# Patient Record
Sex: MALE | Race: WHITE | NOT HISPANIC OR LATINO | Employment: FULL TIME | URBAN - METROPOLITAN AREA
[De-identification: names, ages, dates, MRNs, and addresses within clinical notes are randomized per-mention and may not be internally consistent; named-entity substitution may affect disease eponyms.]

---

## 2017-01-04 DIAGNOSIS — M75.81 ROTATOR CUFF TENDINITIS, RIGHT: Primary | ICD-10-CM

## 2017-01-04 DIAGNOSIS — M75.51 BURSITIS OF RIGHT SHOULDER: ICD-10-CM

## 2017-03-20 ENCOUNTER — OFFICE VISIT (OUTPATIENT)
Dept: FAMILY MEDICINE CLINIC | Facility: CLINIC | Age: 52
End: 2017-03-20

## 2017-03-20 VITALS
RESPIRATION RATE: 16 BRPM | SYSTOLIC BLOOD PRESSURE: 170 MMHG | BODY MASS INDEX: 25.03 KG/M2 | WEIGHT: 169 LBS | TEMPERATURE: 97.4 F | HEART RATE: 84 BPM | HEIGHT: 69 IN | DIASTOLIC BLOOD PRESSURE: 110 MMHG | OXYGEN SATURATION: 96 %

## 2017-03-20 DIAGNOSIS — I10 ESSENTIAL HYPERTENSION: ICD-10-CM

## 2017-03-20 DIAGNOSIS — F41.1 GAD (GENERALIZED ANXIETY DISORDER): ICD-10-CM

## 2017-03-20 PROCEDURE — 99214 OFFICE O/P EST MOD 30 MIN: CPT | Performed by: FAMILY MEDICINE

## 2017-03-20 RX ORDER — IRBESARTAN 300 MG/1
300 TABLET ORAL DAILY
Qty: 30 TABLET | Refills: 5 | Status: SHIPPED | OUTPATIENT
Start: 2017-03-20 | End: 2017-09-20 | Stop reason: SDUPTHER

## 2017-03-20 RX ORDER — ALPRAZOLAM 1 MG/1
1 TABLET ORAL 2 TIMES DAILY PRN
Qty: 60 TABLET | Refills: 2 | Status: SHIPPED | OUTPATIENT
Start: 2017-03-20 | End: 2017-05-17 | Stop reason: SDUPTHER

## 2017-03-20 NOTE — PATIENT INSTRUCTIONS
Exercise 30 minutes most days of the week  Sleep 6-8 hours each night if possible  Low fat, low cholesterol diet   we discussed prescribed medications and how to take them   make sure you get results of any labs/studies ordered today  Low glycemic index diet      Patients must be seen every 3 months for their presription medication review and refill required by [place] law.  Patient has been advised on the potential for addiction  and dependence to their prescribed scheduled medication.  JADEN was obtained today and reviewed. This was scanned into the patient's chart.

## 2017-03-20 NOTE — PROGRESS NOTES
"Subjective   Raphael Garcia is a 51 y.o. male.     History of Present Illness   Chief Complaint:   Chief Complaint   Patient presents with   • Anxiety   • Hypertension       Raphael Garcia 51 y.o. male who presents today for Medical Management of the below listed issues and medication refills. His blood pressure was elevated in the office today: 178/118 and 107/110.  The patient has read and signed the [facility] Controlled Substance Contract. Out of bp meds  1 week  I will continue to see patient for regular follow up appointments.  They are well controlled on their medication.  JADEN has been reviewed by me and is updated every 3 months. The patient is aware of the potential for addiction and dependence.    he has a history of   Patient Active Problem List   Diagnosis   • Hypertension   • JAMEL (generalized anxiety disorder)   • Allergic rhinitis due to pollen   .  Since the last visit, he has overall felt well.  he has been compliant with   Current Outpatient Prescriptions:   •  ALPRAZolam (XANAX) 1 MG tablet, Take 1 tablet by mouth 4 (Four) Times a Day As Needed for anxiety., Disp: 120 tablet, Rfl: 2  •  cetirizine (ZyrTEC) 10 MG tablet, Take 1 tablet by mouth daily., Disp: 30 tablet, Rfl: 11  •  irbesartan (AVAPRO) 300 MG tablet, Take 1 tablet by mouth daily., Disp: 30 tablet, Rfl: 5  •  promethazine (PHENERGAN) 25 MG tablet, Take 1 tablet by mouth every 6 (six) hours as needed for nausea or vomiting., Disp: 16 tablet, Rfl: 0  •  sildenafil (VIAGRA) 100 MG tablet, Take 100 mg by mouth daily as needed for erectile dysfunction., Disp: , Rfl: .  he denies medication side effects.    All of the chronic condition(s) listed above are stable w/o issues.    Visit Vitals   • BP (!) 170/110   • Pulse 84   • Temp 97.4 °F (36.3 °C) (Oral)   • Resp 16   • Ht 69\" (175.3 cm)   • Wt 169 lb (76.7 kg)   • SpO2 96%   • BMI 24.96 kg/m2     The following portions of the patient's history were reviewed and updated " as appropriate: allergies, current medications, past family history, past medical history, past social history, past surgical history and problem list.    Review of Systems   Constitutional: Negative for activity change, appetite change and unexpected weight change.   Eyes: Negative for visual disturbance.   Respiratory: Negative for chest tightness and shortness of breath.    Cardiovascular: Negative for chest pain and palpitations.   Skin: Negative for color change.   Neurological: Negative for syncope and speech difficulty.   Psychiatric/Behavioral: Negative for behavioral problems and confusion.       Objective   Physical Exam   Constitutional: He is oriented to person, place, and time. He appears well-developed and well-nourished.   HENT:   Head: Normocephalic.   Cardiovascular: Normal rate and regular rhythm.    Pulmonary/Chest: Effort normal and breath sounds normal.   Musculoskeletal: Normal range of motion.   Neurological: He is alert and oriented to person, place, and time.   Psychiatric: He has a normal mood and affect. His behavior is normal.   Nursing note and vitals reviewed.      Assessment/Plan   Raphael was seen today for anxiety and hypertension.    Diagnoses and all orders for this visit:    Essential hypertension  -     irbesartan (AVAPRO) 300 MG tablet; Take 1 tablet by mouth Daily.  -     Comprehensive metabolic panel; Future  -     Lipid panel; Future  -     CBC and Differential; Future  -     TSH; Future  -     PSA; Future    JAMEL (generalized anxiety disorder)  -     ALPRAZolam (XANAX) 1 MG tablet; Take 1 tablet by mouth 2 (Two) Times a Day As Needed for Anxiety.  -     Comprehensive metabolic panel; Future  -     Lipid panel; Future  -     CBC and Differential; Future  -     TSH; Future  -     PSA; Future

## 2017-05-17 ENCOUNTER — OFFICE VISIT (OUTPATIENT)
Dept: FAMILY MEDICINE CLINIC | Facility: CLINIC | Age: 52
End: 2017-05-17

## 2017-05-17 VITALS
HEIGHT: 69 IN | BODY MASS INDEX: 24.73 KG/M2 | DIASTOLIC BLOOD PRESSURE: 100 MMHG | SYSTOLIC BLOOD PRESSURE: 170 MMHG | OXYGEN SATURATION: 96 % | TEMPERATURE: 97.5 F | WEIGHT: 167 LBS | HEART RATE: 83 BPM | RESPIRATION RATE: 16 BRPM

## 2017-05-17 DIAGNOSIS — J32.0 MAXILLARY SINUSITIS, UNSPECIFIED CHRONICITY: Primary | ICD-10-CM

## 2017-05-17 DIAGNOSIS — J06.9 ACUTE URI: ICD-10-CM

## 2017-05-17 DIAGNOSIS — R05.9 COUGH: ICD-10-CM

## 2017-05-17 DIAGNOSIS — J02.9 SORE THROAT: ICD-10-CM

## 2017-05-17 DIAGNOSIS — F41.1 GAD (GENERALIZED ANXIETY DISORDER): ICD-10-CM

## 2017-05-17 LAB
EXPIRATION DATE: NORMAL
INTERNAL CONTROL: NORMAL
Lab: NORMAL
S PYO AG THROAT QL: NEGATIVE

## 2017-05-17 PROCEDURE — 99214 OFFICE O/P EST MOD 30 MIN: CPT | Performed by: FAMILY MEDICINE

## 2017-05-17 PROCEDURE — 87880 STREP A ASSAY W/OPTIC: CPT | Performed by: FAMILY MEDICINE

## 2017-05-17 RX ORDER — AMOXICILLIN AND CLAVULANATE POTASSIUM 875; 125 MG/1; MG/1
1 TABLET, FILM COATED ORAL 2 TIMES DAILY
Qty: 20 TABLET | Refills: 0 | Status: SHIPPED | OUTPATIENT
Start: 2017-05-17 | End: 2017-06-20

## 2017-05-17 RX ORDER — ALPRAZOLAM 1 MG/1
1 TABLET ORAL 2 TIMES DAILY PRN
Qty: 5 TABLET | Refills: 0 | Status: SHIPPED | OUTPATIENT
Start: 2017-05-17 | End: 2017-06-20

## 2017-05-17 RX ORDER — ALPRAZOLAM 1 MG/1
1 TABLET ORAL 2 TIMES DAILY PRN
Qty: 5 TABLET | Refills: 0 | Status: SHIPPED | OUTPATIENT
Start: 2017-05-17 | End: 2017-05-17 | Stop reason: SDUPTHER

## 2017-06-19 DIAGNOSIS — F41.1 GAD (GENERALIZED ANXIETY DISORDER): ICD-10-CM

## 2017-06-19 RX ORDER — ALPRAZOLAM 1 MG/1
TABLET ORAL
Qty: 60 TABLET | Refills: 1 | OUTPATIENT
Start: 2017-06-19

## 2017-06-20 ENCOUNTER — OFFICE VISIT (OUTPATIENT)
Dept: FAMILY MEDICINE CLINIC | Facility: CLINIC | Age: 52
End: 2017-06-20

## 2017-06-20 ENCOUNTER — RESULTS ENCOUNTER (OUTPATIENT)
Dept: FAMILY MEDICINE CLINIC | Facility: CLINIC | Age: 52
End: 2017-06-20

## 2017-06-20 VITALS
TEMPERATURE: 97.7 F | HEART RATE: 89 BPM | SYSTOLIC BLOOD PRESSURE: 150 MMHG | RESPIRATION RATE: 16 BRPM | WEIGHT: 157 LBS | DIASTOLIC BLOOD PRESSURE: 78 MMHG | HEIGHT: 69 IN | BODY MASS INDEX: 23.25 KG/M2 | OXYGEN SATURATION: 94 %

## 2017-06-20 DIAGNOSIS — F41.1 GAD (GENERALIZED ANXIETY DISORDER): Primary | ICD-10-CM

## 2017-06-20 DIAGNOSIS — I10 ESSENTIAL HYPERTENSION: ICD-10-CM

## 2017-06-20 DIAGNOSIS — F41.1 GAD (GENERALIZED ANXIETY DISORDER): ICD-10-CM

## 2017-06-20 PROCEDURE — 99214 OFFICE O/P EST MOD 30 MIN: CPT | Performed by: FAMILY MEDICINE

## 2017-06-20 RX ORDER — ALPRAZOLAM 1 MG/1
1 TABLET ORAL 2 TIMES DAILY PRN
Qty: 60 TABLET | Refills: 0 | Status: CANCELLED | OUTPATIENT
Start: 2017-06-20

## 2017-06-20 RX ORDER — ALPRAZOLAM 1 MG/1
TABLET ORAL
Qty: 75 TABLET | Refills: 2 | Status: SHIPPED | OUTPATIENT
Start: 2017-06-20 | End: 2017-09-20 | Stop reason: SDUPTHER

## 2017-06-20 NOTE — PATIENT INSTRUCTIONS
Patients must be seen every 3 months for their presription medication review and refill required by [place] law.  Patient has been advised on the potential for addiction  and dependence to their prescribed scheduled medication.  JADEN was obtained today and reviewed. This was scanned into the patient's chart.  Try to wean down on xanax to total 2mg/day

## 2017-06-20 NOTE — PROGRESS NOTES
"Subjective   Raphael Garcia is a 52 y.o. male.     History of Present Illness   Chief Complaint:   Chief Complaint   Patient presents with   • Anxiety       Raphael Garcia 52 y.o. male who presents today for Medical Management of the below listed issues and medication refills. The patient has read and signed the [facility] Controlled Substance Contract.  I will continue to see patient for regular follow up appointments.  They are well controlled on their medication.  JADEN has been reviewed by me and is updated every 3 months. The patient is aware of the potential for addiction and dependence. We decreased his xanax to 1mg  Bid  Prn and he is having difficult time with decreasing xanax to that dose and would like to be able to take 1/2 of 1 mg  At bedtime prn. See script.    he has a history of   Patient Active Problem List   Diagnosis   • Hypertension   • JAMEL (generalized anxiety disorder)   • Allergic rhinitis due to pollen   .  Since the last visit, he has overall felt well.  he has been compliant with   Current Outpatient Prescriptions:   •  ALPRAZolam (XANAX) 1 MG tablet, Take 1 tablet by mouth 2 (Two) Times a Day As Needed for Anxiety for up to 5 doses., Disp: 5 tablet, Rfl: 0  •  cetirizine (ZyrTEC) 10 MG tablet, Take 1 tablet by mouth daily., Disp: 30 tablet, Rfl: 11  •  irbesartan (AVAPRO) 300 MG tablet, Take 1 tablet by mouth Daily., Disp: 30 tablet, Rfl: 5  •  promethazine (PHENERGAN) 25 MG tablet, Take 1 tablet by mouth every 6 (six) hours as needed for nausea or vomiting., Disp: 16 tablet, Rfl: 0  •  sildenafil (VIAGRA) 100 MG tablet, Take 100 mg by mouth daily as needed for erectile dysfunction., Disp: , Rfl: .  he denies medication side effects.    All of the chronic condition(s) listed above are stable w/o issues.    /92  Pulse 89  Temp 97.7 °F (36.5 °C) (Oral)   Resp 16  Ht 69\" (175.3 cm)  Wt 157 lb (71.2 kg)  SpO2 94%  BMI 23.18 kg/m2      The following portions of " the patient's history were reviewed and updated as appropriate: allergies, current medications, past family history, past medical history, past social history, past surgical history and problem list.    Review of Systems   Constitutional: Negative for activity change, appetite change and unexpected weight change.   Eyes: Negative for visual disturbance.   Respiratory: Negative for chest tightness and shortness of breath.    Cardiovascular: Negative for chest pain and palpitations.   Skin: Negative for color change.   Neurological: Negative for syncope and speech difficulty.   Psychiatric/Behavioral: Negative for behavioral problems and confusion.       Objective   Physical Exam   Constitutional: He is oriented to person, place, and time. He appears well-developed and well-nourished.   HENT:   Head: Normocephalic and atraumatic.   Eyes: EOM are normal. Pupils are equal, round, and reactive to light.   Neck: Normal range of motion. Neck supple.   Cardiovascular: Normal rate and regular rhythm.    Pulmonary/Chest: Effort normal and breath sounds normal.   Abdominal: Soft.   Musculoskeletal: Normal range of motion.   Lymphadenopathy:     He has no cervical adenopathy.   Neurological: He is alert and oriented to person, place, and time.   Psychiatric: He has a normal mood and affect. His behavior is normal.   Nursing note and vitals reviewed.      Assessment/Plan   Raphael was seen today for anxiety.    Diagnoses and all orders for this visit:    JAMEL (generalized anxiety disorder)    Essential hypertension    Other orders  -     Cancel: ALPRAZolam (XANAX) 1 MG tablet; Take 1 tablet by mouth 2 (Two) Times a Day As Needed for Anxiety for up to 5 doses.  -     ALPRAZolam (XANAX) 1 MG tablet; Take one tablet BID and may take an additional one half tablet at night prn

## 2017-09-20 ENCOUNTER — OFFICE VISIT (OUTPATIENT)
Dept: FAMILY MEDICINE CLINIC | Facility: CLINIC | Age: 52
End: 2017-09-20

## 2017-09-20 VITALS
OXYGEN SATURATION: 96 % | WEIGHT: 161 LBS | DIASTOLIC BLOOD PRESSURE: 82 MMHG | HEIGHT: 69 IN | BODY MASS INDEX: 23.85 KG/M2 | TEMPERATURE: 97.3 F | RESPIRATION RATE: 16 BRPM | HEART RATE: 82 BPM | SYSTOLIC BLOOD PRESSURE: 136 MMHG

## 2017-09-20 DIAGNOSIS — F41.1 GAD (GENERALIZED ANXIETY DISORDER): Primary | ICD-10-CM

## 2017-09-20 DIAGNOSIS — J30.1 ALLERGIC RHINITIS DUE TO POLLEN, UNSPECIFIED RHINITIS SEASONALITY: ICD-10-CM

## 2017-09-20 DIAGNOSIS — I10 ESSENTIAL HYPERTENSION: ICD-10-CM

## 2017-09-20 PROCEDURE — 99214 OFFICE O/P EST MOD 30 MIN: CPT | Performed by: FAMILY MEDICINE

## 2017-09-20 RX ORDER — IRBESARTAN 300 MG/1
300 TABLET ORAL DAILY
Qty: 30 TABLET | Refills: 5 | Status: SHIPPED | OUTPATIENT
Start: 2017-09-20 | End: 2018-03-19 | Stop reason: SDUPTHER

## 2017-09-20 RX ORDER — ALPRAZOLAM 1 MG/1
TABLET ORAL
Qty: 75 TABLET | Refills: 2 | Status: SHIPPED | OUTPATIENT
Start: 2017-09-20 | End: 2017-12-15 | Stop reason: SDUPTHER

## 2017-09-20 RX ORDER — CETIRIZINE HYDROCHLORIDE 10 MG/1
10 TABLET ORAL DAILY
Qty: 30 TABLET | Refills: 11 | Status: SHIPPED | OUTPATIENT
Start: 2017-09-20 | End: 2018-09-19 | Stop reason: SDUPTHER

## 2017-09-20 NOTE — PATIENT INSTRUCTIONS
Exercise 30 minutes most days of the week  Sleep 6-8 hours each night if possible  Low fat, low cholesterol diet   we discussed prescribed medications and how to take them   make sure you get results of any labs/studies ordered today  Low glycemic index diet      Patients must be seen every 3 months for their presription medication review and refill required by [place] law.  Patient has been advised on the potential for addiction  and dependence to their prescribed scheduled medication.  JADEN was obtained today and reviewed. This was scanned into the patient's chart.  Do labs already ordered

## 2017-09-20 NOTE — PROGRESS NOTES
"Subjective   Raphael Garcia is a 52 y.o. male.     History of Present Illness   Chief Complaint:   Chief Complaint   Patient presents with   • Anxiety   • Hypertension   • Allergic Rhinitis       Raphael Garcia 52 y.o. male who presents today for Medical Management of the below listed issues and medication refills. The patient has read and signed the [facility] Controlled Substance Contract.  I will continue to see patient for regular follow up appointments.  I discussed decreasing xanax 1mg to 1 bid but he said he does well with anxiety on this dose. He does have severe anxiety and was on it qid.  Refill other meds. They are well controlled on their medication.  JADEN has been reviewed by me and is updated every 3 months. The patient is aware of the potential for addiction and dependence.    he has a problem list of   Patient Active Problem List   Diagnosis   • Hypertension   • JAMEL (generalized anxiety disorder)   • Allergic rhinitis due to pollen   .  Since the last visit, he has overall felt well.  he has been compliant with   Current Outpatient Prescriptions:   •  ALPRAZolam (XANAX) 1 MG tablet, Take one tablet BID and may take an additional one half tablet at night prn, Disp: 75 tablet, Rfl: 2  •  cetirizine (ZyrTEC) 10 MG tablet, Take 1 tablet by mouth daily., Disp: 30 tablet, Rfl: 11  •  irbesartan (AVAPRO) 300 MG tablet, Take 1 tablet by mouth Daily., Disp: 30 tablet, Rfl: 5  •  promethazine (PHENERGAN) 25 MG tablet, Take 1 tablet by mouth every 6 (six) hours as needed for nausea or vomiting., Disp: 16 tablet, Rfl: 0  •  sildenafil (VIAGRA) 100 MG tablet, Take 100 mg by mouth daily as needed for erectile dysfunction., Disp: , Rfl: .  he denies medication side effects.    All of the chronic condition(s) listed above are stable w/o issues.    BP (!) 144/102  Pulse 82  Temp 97.3 °F (36.3 °C) (Oral)   Resp 16  Ht 69\" (175.3 cm)  Wt 161 lb (73 kg)  SpO2 96%  BMI 23.78 kg/m2    The " following portions of the patient's history were reviewed and updated as appropriate: allergies, current medications, past family history, past medical history, past social history, past surgical history and problem list.    Review of Systems   Constitutional: Negative for activity change, appetite change and unexpected weight change.   Eyes: Negative for visual disturbance.   Respiratory: Negative for chest tightness and shortness of breath.    Cardiovascular: Negative for chest pain and palpitations.   Skin: Negative for color change.   Neurological: Negative for syncope and speech difficulty.   Psychiatric/Behavioral: Negative for confusion and decreased concentration.       Objective   Physical Exam    Assessment/Plan   Raphael was seen today for anxiety, hypertension and allergic rhinitis.    Diagnoses and all orders for this visit:    JAMEL (generalized anxiety disorder)  -     ALPRAZolam (XANAX) 1 MG tablet; Take one tablet BID and may take an additional one half tablet at night prn    Essential hypertension  -     irbesartan (AVAPRO) 300 MG tablet; Take 1 tablet by mouth Daily.    Allergic rhinitis due to pollen, unspecified rhinitis seasonality  -     cetirizine (zyrTEC) 10 MG tablet; Take 1 tablet by mouth Daily.

## 2017-12-15 ENCOUNTER — OFFICE VISIT (OUTPATIENT)
Dept: FAMILY MEDICINE CLINIC | Facility: CLINIC | Age: 52
End: 2017-12-15

## 2017-12-15 VITALS
DIASTOLIC BLOOD PRESSURE: 100 MMHG | SYSTOLIC BLOOD PRESSURE: 169 MMHG | HEIGHT: 69 IN | OXYGEN SATURATION: 93 % | BODY MASS INDEX: 24.59 KG/M2 | WEIGHT: 166 LBS | HEART RATE: 94 BPM | RESPIRATION RATE: 16 BRPM | TEMPERATURE: 97.3 F

## 2017-12-15 DIAGNOSIS — F41.1 GAD (GENERALIZED ANXIETY DISORDER): ICD-10-CM

## 2017-12-15 PROCEDURE — 99213 OFFICE O/P EST LOW 20 MIN: CPT | Performed by: FAMILY MEDICINE

## 2017-12-15 RX ORDER — ALPRAZOLAM 1 MG/1
TABLET ORAL
Qty: 75 TABLET | Refills: 2 | Status: SHIPPED | OUTPATIENT
Start: 2017-12-15 | End: 2018-03-19 | Stop reason: SDUPTHER

## 2017-12-15 NOTE — PATIENT INSTRUCTIONS
Patients must be seen every 3 months for their presription medication review and refill required by [place] law.  Patient has been advised on the potential for addiction  and dependence to their prescribed scheduled medication.  JADEN was obtained today and reviewed. This was scanned into the patient's chart.

## 2017-12-15 NOTE — PROGRESS NOTES
"Subjective   Raphael Garcia is a 52 y.o. male.     History of Present Illness   Chief Complaint:   Chief Complaint   Patient presents with   • Anxiety   • Hypertension       Raphael Garcia 52 y.o. male who presents today for Medical Management of the below listed issues and medication refills. His blood pressure is elevated in the office today: 176/90 and 169/100. The patient has read and signed the [facility] Controlled Substance Contract.  I will continue to see patient for regular follow up appointments.  They are well controlled on their medication.  JADEN has been reviewed by me and is updated every 3 months. The patient is aware of the potential for addiction and dependence. This dose is working well.    he has a problem list of   Patient Active Problem List   Diagnosis   • Hypertension   • JAMEL (generalized anxiety disorder)   • Allergic rhinitis due to pollen   .  Since the last visit, he has overall felt well.  he has been compliant with   Current Outpatient Prescriptions:   •  ALPRAZolam (XANAX) 1 MG tablet, Take one tablet BID and may take an additional one half tablet at night prn, Disp: 75 tablet, Rfl: 2  •  cetirizine (zyrTEC) 10 MG tablet, Take 1 tablet by mouth Daily., Disp: 30 tablet, Rfl: 11  •  irbesartan (AVAPRO) 300 MG tablet, Take 1 tablet by mouth Daily., Disp: 30 tablet, Rfl: 5  •  sildenafil (VIAGRA) 100 MG tablet, Take 100 mg by mouth daily as needed for erectile dysfunction., Disp: , Rfl: .  he denies medication side effects.    All of the chronic condition(s) listed above are stable w/o issues.    /100  Pulse 94  Temp 97.3 °F (36.3 °C) (Oral)   Resp 16  Ht 175.3 cm (69\")  Wt 75.3 kg (166 lb)  SpO2 93%  BMI 24.51 kg/m2     The following portions of the patient's history were reviewed and updated as appropriate: allergies, current medications, past family history, past medical history, past social history, past surgical history and problem list.    Review of " Systems   Constitutional: Negative for activity change, appetite change and unexpected weight change.   Eyes: Negative for visual disturbance.   Respiratory: Negative for chest tightness and shortness of breath.    Cardiovascular: Negative for chest pain and palpitations.   Skin: Negative for color change.   Neurological: Negative for syncope and speech difficulty.   Psychiatric/Behavioral: Negative for confusion and decreased concentration.       Objective   Physical Exam   Constitutional: He is oriented to person, place, and time. He appears well-developed and well-nourished.   HENT:   Head: Atraumatic.   Mouth/Throat: Oropharynx is clear and moist.   Eyes: EOM are normal. Pupils are equal, round, and reactive to light.   Neck: Normal range of motion. Neck supple. No thyromegaly present.   Cardiovascular: Normal rate and regular rhythm.    Pulmonary/Chest: Effort normal and breath sounds normal.   Abdominal: Soft.   Musculoskeletal: Normal range of motion.   Neurological: He is alert and oriented to person, place, and time. He has normal reflexes. No cranial nerve deficit.   Skin: Skin is warm and dry.   Psychiatric: He has a normal mood and affect. His behavior is normal. Judgment and thought content normal.   Nursing note and vitals reviewed.      Assessment/Plan   Raphael was seen today for anxiety and hypertension.    Diagnoses and all orders for this visit:    JAMEL (generalized anxiety disorder)  -     ALPRAZolam (XANAX) 1 MG tablet; Take one tablet BID and may take an additional one half tablet at night prn

## 2018-03-19 ENCOUNTER — OFFICE VISIT (OUTPATIENT)
Dept: FAMILY MEDICINE CLINIC | Facility: CLINIC | Age: 53
End: 2018-03-19

## 2018-03-19 VITALS
WEIGHT: 163 LBS | HEIGHT: 69 IN | DIASTOLIC BLOOD PRESSURE: 94 MMHG | TEMPERATURE: 97.4 F | OXYGEN SATURATION: 93 % | RESPIRATION RATE: 16 BRPM | SYSTOLIC BLOOD PRESSURE: 150 MMHG | BODY MASS INDEX: 24.14 KG/M2 | HEART RATE: 89 BPM

## 2018-03-19 DIAGNOSIS — I10 ESSENTIAL HYPERTENSION: ICD-10-CM

## 2018-03-19 DIAGNOSIS — F41.1 GAD (GENERALIZED ANXIETY DISORDER): Primary | ICD-10-CM

## 2018-03-19 DIAGNOSIS — Z12.5 SCREENING FOR PROSTATE CANCER: ICD-10-CM

## 2018-03-19 PROCEDURE — 99214 OFFICE O/P EST MOD 30 MIN: CPT | Performed by: FAMILY MEDICINE

## 2018-03-19 RX ORDER — IRBESARTAN 300 MG/1
300 TABLET ORAL DAILY
Qty: 30 TABLET | Refills: 5 | Status: SHIPPED | OUTPATIENT
Start: 2018-03-19 | End: 2018-09-19 | Stop reason: SDUPTHER

## 2018-03-19 RX ORDER — ALPRAZOLAM 1 MG/1
TABLET ORAL
Qty: 75 TABLET | Refills: 2 | Status: SHIPPED | OUTPATIENT
Start: 2018-03-19 | End: 2018-06-18 | Stop reason: SDUPTHER

## 2018-03-19 NOTE — PROGRESS NOTES
"Subjective   Raphael Garcia is a 52 y.o. male.     History of Present Illness   Chief Complaint:   Chief Complaint   Patient presents with   • Anxiety   • Hypertension       Raphael Garcia 52 y.o. male who presents today for Medical Management of the below listed issues and medication refills. His blood pressure was elevated in the office today: 186/112 and 168/112. He stated that he has been without his blood pressure medication for 2 days.  The patient has read and signed the [facility] Controlled Substance Contract.  I will continue to see patient for regular follow up appointments.  They are well controlled on their medication.  JADEN has been reviewed by me and is updated every 3 months. The patient is aware of the potential for addiction and dependence. I got 140/94.    he has a problem list of   Patient Active Problem List   Diagnosis   • Hypertension   • JAMEL (generalized anxiety disorder)   • Allergic rhinitis due to pollen   .  Since the last visit, he has overall felt well.  he has been compliant with   Current Outpatient Prescriptions:   •  ALPRAZolam (XANAX) 1 MG tablet, Take one tablet BID and may take an additional one half tablet at night prn, Disp: 75 tablet, Rfl: 2  •  cetirizine (zyrTEC) 10 MG tablet, Take 1 tablet by mouth Daily., Disp: 30 tablet, Rfl: 11  •  irbesartan (AVAPRO) 300 MG tablet, Take 1 tablet by mouth Daily., Disp: 30 tablet, Rfl: 5  •  sildenafil (VIAGRA) 100 MG tablet, Take 100 mg by mouth daily as needed for erectile dysfunction., Disp: , Rfl: .  he denies medication side effects.    All of the chronic condition(s) listed above are stable w/o issues.    BP (!) 168/112   Pulse 89   Temp 97.4 °F (36.3 °C) (Oral)   Resp 16   Ht 175.3 cm (69\")   Wt 73.9 kg (163 lb)   SpO2 93%   BMI 24.07 kg/m²     The following portions of the patient's history were reviewed and updated as appropriate: allergies, current medications, past family history, past medical " history, past social history, past surgical history and problem list.    Review of Systems   Constitutional: Negative for activity change, appetite change, chills, fatigue, fever and unexpected weight change.   HENT: Negative for congestion, ear pain, hearing loss, mouth sores, nosebleeds, rhinorrhea and sore throat.    Eyes: Negative for pain and visual disturbance.   Respiratory: Negative for cough, shortness of breath and wheezing.    Cardiovascular: Negative for chest pain, palpitations and leg swelling.   Gastrointestinal: Negative for abdominal distention, abdominal pain, blood in stool, constipation, diarrhea, nausea and vomiting.   Endocrine: Negative for cold intolerance and heat intolerance.   Genitourinary: Negative for difficulty urinating, discharge, dysuria, frequency, hematuria and urgency.   Musculoskeletal: Negative for back pain and joint swelling.   Skin: Negative for rash and wound.   Neurological: Negative for dizziness, weakness, numbness and headaches.   Hematological: Does not bruise/bleed easily.   Psychiatric/Behavioral: Negative for confusion, dysphoric mood, sleep disturbance and suicidal ideas. The patient is not nervous/anxious.        Objective   Physical Exam   Constitutional: He is oriented to person, place, and time. He appears well-developed and well-nourished.   HENT:   Head: Atraumatic.   Mouth/Throat: Oropharynx is clear and moist.   Eyes: EOM are normal. Pupils are equal, round, and reactive to light.   Neck: Normal range of motion. Neck supple. No thyromegaly present.   Cardiovascular: Normal rate and regular rhythm.    Pulmonary/Chest: Effort normal and breath sounds normal.   Abdominal: Soft.   Musculoskeletal: Normal range of motion.   Neurological: He is alert and oriented to person, place, and time.   Skin: Skin is warm and dry.   Psychiatric: He has a normal mood and affect. His behavior is normal.   Nursing note and vitals reviewed.      Assessment/Plan   Raphael was seen  today for anxiety and hypertension.    Diagnoses and all orders for this visit:    JAMEL (generalized anxiety disorder)  -     ALPRAZolam (XANAX) 1 MG tablet; Take one tablet BID and may take an additional one half tablet at night prn  -     Comprehensive metabolic panel; Future  -     Lipid panel; Future  -     PSA SCREENING; Future  -     CBC w AUTO Differential; Future  -     TSH; Future    Essential hypertension  -     irbesartan (AVAPRO) 300 MG tablet; Take 1 tablet by mouth Daily.  -     Comprehensive metabolic panel; Future  -     Lipid panel; Future  -     PSA SCREENING; Future  -     CBC w AUTO Differential; Future  -     TSH; Future    Screening for prostate cancer  -     Comprehensive metabolic panel; Future  -     Lipid panel; Future  -     PSA SCREENING; Future  -     CBC w AUTO Differential; Future  -     TSH; Future

## 2018-05-19 ENCOUNTER — RESULTS ENCOUNTER (OUTPATIENT)
Dept: FAMILY MEDICINE CLINIC | Facility: CLINIC | Age: 53
End: 2018-05-19

## 2018-05-19 DIAGNOSIS — Z12.5 SCREENING FOR PROSTATE CANCER: ICD-10-CM

## 2018-05-19 DIAGNOSIS — I10 ESSENTIAL HYPERTENSION: ICD-10-CM

## 2018-05-19 DIAGNOSIS — F41.1 GAD (GENERALIZED ANXIETY DISORDER): ICD-10-CM

## 2018-06-18 ENCOUNTER — OFFICE VISIT (OUTPATIENT)
Dept: FAMILY MEDICINE CLINIC | Facility: CLINIC | Age: 53
End: 2018-06-18

## 2018-06-18 VITALS
WEIGHT: 163 LBS | TEMPERATURE: 97.9 F | HEIGHT: 69 IN | SYSTOLIC BLOOD PRESSURE: 156 MMHG | DIASTOLIC BLOOD PRESSURE: 97 MMHG | BODY MASS INDEX: 24.14 KG/M2 | OXYGEN SATURATION: 95 % | HEART RATE: 86 BPM

## 2018-06-18 DIAGNOSIS — I10 ESSENTIAL HYPERTENSION: ICD-10-CM

## 2018-06-18 DIAGNOSIS — F41.1 GAD (GENERALIZED ANXIETY DISORDER): Primary | ICD-10-CM

## 2018-06-18 DIAGNOSIS — F41.1 GAD (GENERALIZED ANXIETY DISORDER): ICD-10-CM

## 2018-06-18 PROCEDURE — 99213 OFFICE O/P EST LOW 20 MIN: CPT | Performed by: NURSE PRACTITIONER

## 2018-06-18 RX ORDER — ALPRAZOLAM 1 MG/1
TABLET ORAL
Qty: 75 TABLET | Refills: 2 | Status: SHIPPED | OUTPATIENT
Start: 2018-06-18 | End: 2018-09-19 | Stop reason: SDUPTHER

## 2018-06-18 RX ORDER — HYDROCHLOROTHIAZIDE 25 MG/1
25 TABLET ORAL DAILY
Qty: 30 TABLET | Refills: 0 | Status: SHIPPED | OUTPATIENT
Start: 2018-06-18 | End: 2018-09-19 | Stop reason: SDUPTHER

## 2018-06-18 NOTE — PROGRESS NOTES
Subjective   Raphael Garcia is a 53 y.o. male.     History of Present Illness   Raphael Garcia male 53 y.o. who presents today for follow up of Anxiety.  He reports medication is working well, patient desires to continue on Rx, and needs refill. Onset of symptoms was approximately 20  years ago.  He denies current suicidal and homicidal ideation. Risk factors are prior diagnosis of anxiety.  Previous treatment includes current Rx.  He complains of the following medication side effects: none.  Vicente reviewed by me.        BP is elevated today and has been for the past few visits.  Patient is taking irbesartan as prescribed but has been on medication for years.   The following portions of the patient's history were reviewed and updated as appropriate: allergies, current medications, past family history, past medical history, past social history, past surgical history and problem list.    Review of Systems   Constitutional: Negative for fatigue.   Respiratory: Negative for cough and shortness of breath.    Cardiovascular: Negative for chest pain and palpitations.   Skin: Negative for rash.   Psychiatric/Behavioral: Negative for dysphoric mood, self-injury, sleep disturbance and suicidal ideas. The patient is nervous/anxious.        Objective   Physical Exam   Constitutional: He is oriented to person, place, and time. He appears well-developed and well-nourished.   Cardiovascular: Normal rate and regular rhythm.    Pulmonary/Chest: Effort normal and breath sounds normal.   Neurological: He is oriented to person, place, and time.   Skin: Skin is warm and dry.   Psychiatric: He has a normal mood and affect. His behavior is normal. Judgment and thought content normal.   Nursing note and vitals reviewed.      Assessment/Plan   Raphael was seen today for anxiety.    Diagnoses and all orders for this visit:    JAMEL (generalized anxiety disorder)    Essential hypertension  -     hydrochlorothiazide (HYDRODIURIL)  25 MG tablet; Take 1 tablet by mouth Daily.        Xanax refilled per Dr. Jonas.     Patient to f/u in 2-4 weeks for BP recheck.

## 2018-09-19 ENCOUNTER — OFFICE VISIT (OUTPATIENT)
Dept: FAMILY MEDICINE CLINIC | Facility: CLINIC | Age: 53
End: 2018-09-19

## 2018-09-19 VITALS
HEART RATE: 101 BPM | TEMPERATURE: 97.7 F | RESPIRATION RATE: 16 BRPM | BODY MASS INDEX: 23.85 KG/M2 | WEIGHT: 161 LBS | OXYGEN SATURATION: 92 % | DIASTOLIC BLOOD PRESSURE: 108 MMHG | HEIGHT: 69 IN | SYSTOLIC BLOOD PRESSURE: 172 MMHG

## 2018-09-19 DIAGNOSIS — F41.1 GAD (GENERALIZED ANXIETY DISORDER): ICD-10-CM

## 2018-09-19 DIAGNOSIS — J40 BRONCHITIS: ICD-10-CM

## 2018-09-19 DIAGNOSIS — I10 ESSENTIAL HYPERTENSION: Primary | ICD-10-CM

## 2018-09-19 DIAGNOSIS — Z71.6 TOBACCO ABUSE COUNSELING: ICD-10-CM

## 2018-09-19 PROCEDURE — 99214 OFFICE O/P EST MOD 30 MIN: CPT | Performed by: FAMILY MEDICINE

## 2018-09-19 PROCEDURE — 99406 BEHAV CHNG SMOKING 3-10 MIN: CPT | Performed by: FAMILY MEDICINE

## 2018-09-19 RX ORDER — CEFDINIR 300 MG/1
300 CAPSULE ORAL 2 TIMES DAILY
Qty: 20 CAPSULE | Refills: 0 | Status: SHIPPED | OUTPATIENT
Start: 2018-09-19 | End: 2018-12-14

## 2018-09-19 RX ORDER — HYDROCHLOROTHIAZIDE 25 MG/1
25 TABLET ORAL DAILY
Qty: 30 TABLET | Refills: 5 | Status: SHIPPED | OUTPATIENT
Start: 2018-09-19 | End: 2019-03-20

## 2018-09-19 RX ORDER — BENZONATATE 200 MG/1
200 CAPSULE ORAL 3 TIMES DAILY PRN
Qty: 30 CAPSULE | Refills: 0 | Status: SHIPPED | OUTPATIENT
Start: 2018-09-19 | End: 2018-12-14

## 2018-09-19 RX ORDER — CETIRIZINE HYDROCHLORIDE 10 MG/1
10 TABLET ORAL DAILY
Qty: 30 TABLET | Refills: 11 | Status: SHIPPED | OUTPATIENT
Start: 2018-09-19 | End: 2019-03-20 | Stop reason: SDUPTHER

## 2018-09-19 RX ORDER — IRBESARTAN 300 MG/1
300 TABLET ORAL DAILY
Qty: 30 TABLET | Refills: 5 | Status: SHIPPED | OUTPATIENT
Start: 2018-09-19 | End: 2019-03-20 | Stop reason: SDUPTHER

## 2018-09-19 RX ORDER — ALPRAZOLAM 1 MG/1
TABLET ORAL
Qty: 75 TABLET | Refills: 2 | Status: SHIPPED | OUTPATIENT
Start: 2018-09-19 | End: 2018-12-14 | Stop reason: SDUPTHER

## 2018-09-19 NOTE — PATIENT INSTRUCTIONS
Exercise 30 minutes most days of the week  Sleep 6-8 hours each night if possible  Low fat, low cholesterol diet   we discussed prescribed medications and how to take them   make sure you get results of any labs/studies ordered today  Low glycemic index diet   Stop smoking cigarettes     Discussed method to stop smoking  With santo   Gave him papers on chantix          Patients must be seen every 3 months for their presription medication review and refill required by [place] law.  Patient has been advised on the potential for addiction  and dependence to their prescribed scheduled medication.  JADEN was obtained today and reviewed. This was scanned into the patient's chart.

## 2018-09-19 NOTE — PROGRESS NOTES
Subjective   Chief Complaint:   Chief Complaint   Patient presents with   • Hypertension   • Anxiety         History of Present Illness patient is here to day to get meds refilled for anxiety and hypertension.  Here  For  anxiety he is on Xanax 1 mg and he takes 1 in the morning one at supper and a half at bedtime when necessary.  He has decreased his Xanax from the past.  This dose and frequency helps his anxiety.  Allergies he is on Zyrtec.  Blood pressure today was 140/88.  His coughing a lot and his blood pressure could be elevated due to his cough.  Nurse got 172/108 but I do not yet a blood pressure that high.  Check blood pressure and got 140/88 again.  For hypertension  he is on hydrochlorothiazide 25 mg daily and Avapro 300 mg daily.  His bronchitis started yesterday and he has a cough.  He does smoke a pack of cigarettes per day.  I am placing him on Tessalon Perles and Omnicef 300 mg twice a day for 10 days.  I refilled his Xanax as stated above.The patient has read and signed the [facility] Controlled Substance Contract.  I will continue to see patient for regular follow up appointments.  They are well controlled on their medication.  JADEN has been reviewed by me and is updated every 3 months. The patient is aware of the potential for addiction and dependence. I spent 7 minutes discussing to stop smoking cigarettes.  And discussed different methods to stop smoking and I gave him some papers on Chantix tablets.            Raphael Garcia 53 y.o. male who presents today for Medical Management of the below listed issues and medication refills.    ICD-10-CM ICD-9-CM   1. JAMEL (generalized anxiety disorder) F41.1 300.02   2. Essential hypertension I10 401.9        he has a problem list of   Patient Active Problem List   Diagnosis   • Hypertension   • JAMEL (generalized anxiety disorder)   • Allergic rhinitis due to pollen   .  Since the last visit, he has overall felt well.  he has been compliant  "with   Current Outpatient Prescriptions:   •  ALPRAZolam (XANAX) 1 MG tablet, Take one tablet BID and may take an additional one half tablet at night prn, Disp: 75 tablet, Rfl: 2  •  cetirizine (zyrTEC) 10 MG tablet, Take 1 tablet by mouth Daily., Disp: 30 tablet, Rfl: 11  •  hydrochlorothiazide (HYDRODIURIL) 25 MG tablet, Take 1 tablet by mouth Daily., Disp: 30 tablet, Rfl: 0  •  irbesartan (AVAPRO) 300 MG tablet, Take 1 tablet by mouth Daily., Disp: 30 tablet, Rfl: 5  •  sildenafil (VIAGRA) 100 MG tablet, Take 100 mg by mouth daily as needed for erectile dysfunction., Disp: , Rfl: .  he denies medication side effects.    All of the chronic condition(s) listed above are stable w/o issues.    BP (!) 172/108   Pulse 101   Temp 97.7 °F (36.5 °C)   Resp 16   Ht 175.3 cm (69\")   Wt 73 kg (161 lb)   SpO2 92%   BMI 23.78 kg/m²     Results for orders placed or performed in visit on 05/17/17   POCT rapid strep A   Result Value Ref Range    Rapid Strep A Screen Negative Negative, VALID, INVALID, Not Performed    Internal Control Passed Passed    Lot Number [id number]     Expiration Date 09/30/2018              The following portions of the patient's history were reviewed and updated as appropriate: allergies, current medications, past family history, past medical history, past social history, past surgical history and problem list.    Review of Systems   Constitutional: Negative for appetite change, fatigue and fever.   HENT: Positive for congestion. Negative for ear pain, sinus pain, sinus pressure and sore throat.    Eyes: Negative for discharge and visual disturbance.   Respiratory: Positive for cough. Negative for apnea, chest tightness and shortness of breath.    Cardiovascular: Negative for chest pain and leg swelling.   Gastrointestinal: Negative for abdominal distention and abdominal pain.   Genitourinary: Negative for flank pain and frequency.   Musculoskeletal: Negative for arthralgias and back pain.   Skin: " Negative for rash.   Neurological: Negative for dizziness, facial asymmetry and headaches.   Psychiatric/Behavioral: Negative for agitation and confusion.       Objective   Physical Exam   Constitutional: He is oriented to person, place, and time. He appears well-developed and well-nourished.   HENT:   Head: Normocephalic.   Right Ear: External ear normal.   Left Ear: External ear normal.   Mouth/Throat: Oropharynx is clear and moist. No oropharyngeal exudate.   Eyes: Pupils are equal, round, and reactive to light.   Cardiovascular: Normal rate, regular rhythm and normal heart sounds.    Pulmonary/Chest: Effort normal and breath sounds normal. No respiratory distress. He has no wheezes. He has no rales.   Abdominal: Soft. There is no tenderness.   Musculoskeletal: Normal range of motion. He exhibits no tenderness.   Lymphadenopathy:     He has no cervical adenopathy.   Neurological: He is alert and oriented to person, place, and time.   Skin: Skin is warm. No rash noted.   Psychiatric: He has a normal mood and affect. His behavior is normal. Thought content normal.   Nursing note and vitals reviewed.      Assessment/Plan   Raphael was seen today for hypertension and anxiety.    Diagnoses and all orders for this visit:    JAMEL (generalized anxiety disorder)  -     ALPRAZolam (XANAX) 1 MG tablet; Take one tablet BID and may take an additional one half tablet at night prn    Essential hypertension  -     hydrochlorothiazide (HYDRODIURIL) 25 MG tablet; Take 1 tablet by mouth Daily.  -     irbesartan (AVAPRO) 300 MG tablet; Take 1 tablet by mouth Daily.    Other orders  -     cetirizine (zyrTEC) 10 MG tablet; Take 1 tablet by mouth Daily.

## 2018-12-14 ENCOUNTER — OFFICE VISIT (OUTPATIENT)
Dept: FAMILY MEDICINE CLINIC | Facility: CLINIC | Age: 53
End: 2018-12-14

## 2018-12-14 VITALS
RESPIRATION RATE: 16 BRPM | HEIGHT: 69 IN | WEIGHT: 164 LBS | DIASTOLIC BLOOD PRESSURE: 98 MMHG | OXYGEN SATURATION: 96 % | TEMPERATURE: 97.5 F | HEART RATE: 88 BPM | SYSTOLIC BLOOD PRESSURE: 162 MMHG | BODY MASS INDEX: 24.29 KG/M2

## 2018-12-14 DIAGNOSIS — N52.9 ERECTILE DYSFUNCTION, UNSPECIFIED ERECTILE DYSFUNCTION TYPE: ICD-10-CM

## 2018-12-14 DIAGNOSIS — F41.1 GAD (GENERALIZED ANXIETY DISORDER): Primary | ICD-10-CM

## 2018-12-14 DIAGNOSIS — I10 ESSENTIAL HYPERTENSION: ICD-10-CM

## 2018-12-14 PROCEDURE — 99214 OFFICE O/P EST MOD 30 MIN: CPT | Performed by: FAMILY MEDICINE

## 2018-12-14 RX ORDER — SILDENAFIL 100 MG/1
100 TABLET, FILM COATED ORAL DAILY PRN
Qty: 5 TABLET | Refills: 5 | Status: SHIPPED | OUTPATIENT
Start: 2018-12-14 | End: 2018-12-14 | Stop reason: SDUPTHER

## 2018-12-14 RX ORDER — ALPRAZOLAM 1 MG/1
TABLET ORAL
Qty: 75 TABLET | Refills: 2 | Status: SHIPPED | OUTPATIENT
Start: 2018-12-14 | End: 2019-03-20 | Stop reason: SDUPTHER

## 2018-12-14 RX ORDER — SILDENAFIL 100 MG/1
100 TABLET, FILM COATED ORAL DAILY PRN
Qty: 5 TABLET | Refills: 5 | Status: SHIPPED | OUTPATIENT
Start: 2018-12-14 | End: 2019-03-20

## 2019-02-14 ENCOUNTER — OFFICE VISIT (OUTPATIENT)
Dept: FAMILY MEDICINE CLINIC | Facility: CLINIC | Age: 54
End: 2019-02-14

## 2019-02-14 VITALS
WEIGHT: 162 LBS | HEIGHT: 69 IN | SYSTOLIC BLOOD PRESSURE: 146 MMHG | RESPIRATION RATE: 16 BRPM | BODY MASS INDEX: 23.99 KG/M2 | TEMPERATURE: 98 F | DIASTOLIC BLOOD PRESSURE: 95 MMHG | HEART RATE: 86 BPM

## 2019-02-14 DIAGNOSIS — J01.00 ACUTE NON-RECURRENT MAXILLARY SINUSITIS: Primary | ICD-10-CM

## 2019-02-14 DIAGNOSIS — R68.89 FLU-LIKE SYMPTOMS: ICD-10-CM

## 2019-02-14 LAB
EXPIRATION DATE: NORMAL
FLUAV AG NPH QL: NEGATIVE
FLUBV AG NPH QL: NEGATIVE
INTERNAL CONTROL: NORMAL
Lab: NORMAL

## 2019-02-14 PROCEDURE — 99213 OFFICE O/P EST LOW 20 MIN: CPT | Performed by: FAMILY MEDICINE

## 2019-02-14 PROCEDURE — 87804 INFLUENZA ASSAY W/OPTIC: CPT | Performed by: FAMILY MEDICINE

## 2019-02-14 RX ORDER — AMOXICILLIN AND CLAVULANATE POTASSIUM 875; 125 MG/1; MG/1
1 TABLET, FILM COATED ORAL EVERY 12 HOURS SCHEDULED
Qty: 20 TABLET | Refills: 0 | Status: SHIPPED | OUTPATIENT
Start: 2019-02-14 | End: 2019-03-20

## 2019-02-14 NOTE — PROGRESS NOTES
"Subjective   Raphael Garcia is a 53 y.o. male.     CC: URI    History of Present Illness     Raphael Garcia 53 y.o. male who presents for evaluation of sinus pressure and congestion, cough. Symptoms include congestion and productive cough.  Onset of symptoms was 4 days ago, unchanged since that time. Patient denies hemoptysis.   Evaluation to date: none Treatment to date:  OTC oral decongestants and OTC antihistamines.       The following portions of the patient's history were reviewed and updated as appropriate: allergies, current medications, past family history, past medical history, past social history, past surgical history and problem list.    Review of Systems   Constitutional: Negative for activity change, chills, fatigue and fever.   HENT: Positive for congestion, postnasal drip and sinus pressure.    Respiratory: Positive for cough. Negative for chest tightness.    Cardiovascular: Negative for chest pain and palpitations.   Gastrointestinal: Negative for abdominal pain and nausea.   Endocrine: Negative for cold intolerance and polydipsia.   Psychiatric/Behavioral: Negative for behavioral problems and dysphoric mood.   All other systems reviewed and are negative.    /95   Pulse 86   Temp 98 °F (36.7 °C) (Oral)   Resp 16   Ht 175.3 cm (69\")   Wt 73.5 kg (162 lb)   BMI 23.92 kg/m²     Objective   Physical Exam   Constitutional: He appears well-developed and well-nourished.   HENT:   Right Ear: Tympanic membrane normal.   Left Ear: Tympanic membrane normal.   Nose: Right sinus exhibits maxillary sinus tenderness. Left sinus exhibits maxillary sinus tenderness.   Mouth/Throat: Oropharynx is clear and moist. No oropharyngeal exudate, posterior oropharyngeal erythema or tonsillar abscesses.   Neck: Neck supple. No thyromegaly present.   Cardiovascular: Normal rate and regular rhythm.   No murmur heard.  Pulmonary/Chest: Effort normal and breath sounds normal.   Abdominal: Bowel sounds " are normal.   Psychiatric: He has a normal mood and affect. His behavior is normal.   Nursing note and vitals reviewed.  Flu Screen: NEGATIVE    Assessment/Plan   Raphael was seen today for nasal congestion, sinusitis, cough and flu like symptoms.    Diagnoses and all orders for this visit:    Acute non-recurrent maxillary sinusitis  -     amoxicillin-clavulanate (AUGMENTIN) 875-125 MG per tablet; Take 1 tablet by mouth Every 12 (Twelve) Hours.    Flu-like symptoms  -     POC Influenza A / B

## 2019-03-20 ENCOUNTER — OFFICE VISIT (OUTPATIENT)
Dept: FAMILY MEDICINE CLINIC | Facility: CLINIC | Age: 54
End: 2019-03-20

## 2019-03-20 VITALS
TEMPERATURE: 97.4 F | SYSTOLIC BLOOD PRESSURE: 150 MMHG | RESPIRATION RATE: 16 BRPM | HEIGHT: 69 IN | OXYGEN SATURATION: 97 % | DIASTOLIC BLOOD PRESSURE: 76 MMHG | WEIGHT: 166 LBS | HEART RATE: 83 BPM | BODY MASS INDEX: 24.59 KG/M2

## 2019-03-20 DIAGNOSIS — F41.1 GAD (GENERALIZED ANXIETY DISORDER): Primary | ICD-10-CM

## 2019-03-20 DIAGNOSIS — N52.9 ERECTILE DYSFUNCTION, UNSPECIFIED ERECTILE DYSFUNCTION TYPE: ICD-10-CM

## 2019-03-20 DIAGNOSIS — I10 ESSENTIAL HYPERTENSION: ICD-10-CM

## 2019-03-20 PROCEDURE — 99214 OFFICE O/P EST MOD 30 MIN: CPT | Performed by: FAMILY MEDICINE

## 2019-03-20 RX ORDER — IRBESARTAN 300 MG/1
300 TABLET ORAL DAILY
Qty: 30 TABLET | Refills: 5 | Status: SHIPPED | OUTPATIENT
Start: 2019-03-20 | End: 2019-09-30 | Stop reason: SDUPTHER

## 2019-03-20 RX ORDER — CETIRIZINE HYDROCHLORIDE 10 MG/1
10 TABLET ORAL DAILY
Qty: 30 TABLET | Refills: 5 | Status: SHIPPED | OUTPATIENT
Start: 2019-03-20 | End: 2020-03-18 | Stop reason: SDUPTHER

## 2019-03-20 RX ORDER — ALPRAZOLAM 1 MG/1
TABLET ORAL
Qty: 75 TABLET | Refills: 2 | Status: SHIPPED | OUTPATIENT
Start: 2019-03-20 | End: 2019-06-20 | Stop reason: SDUPTHER

## 2019-03-20 NOTE — PROGRESS NOTES
Subjective   Chief Complaint: hypertension   anxiety      History of Present Illness     The patient has read and signed the [facility] Controlled Substance Contract.  I will continue to see patient for regular follow up appointments.  They are well controlled on their medication.  JADEN has been reviewed by me and is updated every 3 months. The patient is aware of the potential for addiction and dependence.  He is on Xanax 1 mg he takes 1 tablet twice daily and may take an additional 1/2 tablet as needed anxiety he gets #75 with 2 refills.  He is also on Zyrtec 10 mg for allergies and is on Avapro 300 mg tablets 1 tablet by mouth daily for blood pressure in the office his blood pressure was 150/76.  Pressure he gets better at home so he can bring me a list of his blood pressures from home next visit.  Just getting the Viagra sildenafil at 100 mg 1 p.o. daily as needed erectile dysfunction and cutting those tablets in half and it would be less expensive so give him another prescription is going to try that again.            Raphael Garcia 53 y.o. male who presents today for Medical Management of the below listed issues and medication refills.  He was seen for hypertension and anxiety.    ICD-10-CM ICD-9-CM   1. JAMEL (generalized anxiety disorder) F41.1 300.02   2. Essential hypertension I10 401.9   3. Erectile dysfunction, unspecified erectile dysfunction type N52.9 607.84        he has a problem list of   Patient Active Problem List   Diagnosis   • Hypertension   • JAMEL (generalized anxiety disorder)   • Allergic rhinitis due to pollen   .  Since the last visit, he has overall felt well.  he has been compliant with   Current Outpatient Medications:   •  ALPRAZolam (XANAX) 1 MG tablet, Take one tablet BID and may take an additional one half tablet at night prn, Disp: 75 tablet, Rfl: 2  •  cetirizine (zyrTEC) 10 MG tablet, Take 1 tablet by mouth Daily., Disp: 30 tablet, Rfl: 5  •  irbesartan (AVAPRO) 300  "MG tablet, Take 1 tablet by mouth Daily., Disp: 30 tablet, Rfl: 5.  he denies medication side effects.    All of the chronic condition(s) listed above are stable w/o issues.    /76   Pulse 83   Temp 97.4 °F (36.3 °C)   Resp 16   Ht 175.3 cm (69\")   Wt 75.3 kg (166 lb)   SpO2 97%   BMI 24.51 kg/m²     Results for orders placed or performed in visit on 02/14/19   POC Influenza A / B   Result Value Ref Range    Rapid Influenza A Ag Negative Negative    Rapid Influenza B Ag Negative Negative    Internal Control Passed Passed    Lot Number [id number]     Expiration Date 9/21/2021              The following portions of the patient's history were reviewed and updated as appropriate: allergies, current medications, past family history, past medical history, past social history, past surgical history and problem list.    Review of Systems   Constitutional: Negative for activity change, appetite change and unexpected weight change.   Eyes: Negative for visual disturbance.   Respiratory: Negative for chest tightness and shortness of breath.    Cardiovascular: Negative for chest pain and palpitations.   Skin: Negative for color change.   Neurological: Negative for syncope and speech difficulty.   Psychiatric/Behavioral: Negative for confusion and decreased concentration.       Objective   Physical Exam   Constitutional: He appears well-developed and well-nourished.   HENT:   Right Ear: Tympanic membrane, external ear and ear canal normal.   Left Ear: Tympanic membrane, external ear and ear canal normal.   Nose: Nose normal.   Mouth/Throat: Uvula is midline and oropharynx is clear and moist.   Eyes: Conjunctivae and EOM are normal. Pupils are equal, round, and reactive to light.   Neck: Normal range of motion. Neck supple. No thyromegaly present.   Cardiovascular: Normal rate and regular rhythm.   Pulmonary/Chest: Effort normal and breath sounds normal. He has no wheezes. He has no rales. He exhibits no tenderness. "   Abdominal: Soft. Bowel sounds are normal.   Musculoskeletal: Normal range of motion.   Neurological: He is alert.   Psychiatric: He has a normal mood and affect. His behavior is normal.   Nursing note and vitals reviewed.      Assessment/Plan   Diagnoses and all orders for this visit:    JAMEL (generalized anxiety disorder)    Essential hypertension  -     irbesartan (AVAPRO) 300 MG tablet; Take 1 tablet by mouth Daily.    Erectile dysfunction, unspecified erectile dysfunction type    Other orders  -     ALPRAZolam (XANAX) 1 MG tablet; Take one tablet BID and may take an additional one half tablet at night prn  -     cetirizine (zyrTEC) 10 MG tablet; Take 1 tablet by mouth Daily.

## 2019-06-20 ENCOUNTER — OFFICE VISIT (OUTPATIENT)
Dept: FAMILY MEDICINE CLINIC | Facility: CLINIC | Age: 54
End: 2019-06-20

## 2019-06-20 VITALS
WEIGHT: 160 LBS | RESPIRATION RATE: 16 BRPM | TEMPERATURE: 97.7 F | HEIGHT: 69 IN | BODY MASS INDEX: 23.7 KG/M2 | DIASTOLIC BLOOD PRESSURE: 82 MMHG | HEART RATE: 78 BPM | SYSTOLIC BLOOD PRESSURE: 150 MMHG | OXYGEN SATURATION: 97 %

## 2019-06-20 DIAGNOSIS — F41.1 GAD (GENERALIZED ANXIETY DISORDER): Primary | ICD-10-CM

## 2019-06-20 PROCEDURE — 99212 OFFICE O/P EST SF 10 MIN: CPT | Performed by: NURSE PRACTITIONER

## 2019-06-20 RX ORDER — ALPRAZOLAM 1 MG/1
TABLET ORAL
Qty: 75 TABLET | Refills: 2 | Status: SHIPPED | OUTPATIENT
Start: 2019-06-20 | End: 2019-09-17 | Stop reason: SDUPTHER

## 2019-06-20 NOTE — PROGRESS NOTES
Subjective   Raphael Garcia is a 54 y.o. male.     History of Present Illness   Raphael Garcia male 54 y.o. who presents today for follow up of Anxiety.  He reports Xanax is working well at current dose and he needs a refill.   He denies current suicidal and homicidal ideation. Risk factors are prior diagnosis of anxiety.  Previous treatment includes current Rx.  He complains of the following medication side effects: none.        BP has been elevated at past 4 visits. Patient states he gets nervous coming to the doctor's office.   The following portions of the patient's history were reviewed and updated as appropriate: allergies, current medications, past family history, past medical history, past social history, past surgical history and problem list.    Review of Systems   Constitutional: Negative for fatigue.   Respiratory: Negative for cough and shortness of breath.    Cardiovascular: Negative for chest pain and palpitations.   Skin: Negative for rash.   Psychiatric/Behavioral: Negative for agitation, dysphoric mood, self-injury, sleep disturbance and suicidal ideas. The patient is nervous/anxious.        Objective   Physical Exam   Constitutional: He is oriented to person, place, and time. He appears well-developed and well-nourished.   Pulmonary/Chest: Effort normal.   Neurological: He is oriented to person, place, and time.   Skin: Skin is warm and dry.   Psychiatric: He has a normal mood and affect. His behavior is normal. Judgment and thought content normal.   Nursing note and vitals reviewed.      Assessment/Plan   Raphael was seen today for med refill and anxiety.    Diagnoses and all orders for this visit:    JAMEL (generalized anxiety disorder)      Wife is a nurse and will check BP at home.     Xanax refilled. JADEN reviewed.

## 2019-09-17 ENCOUNTER — OFFICE VISIT (OUTPATIENT)
Dept: FAMILY MEDICINE CLINIC | Facility: CLINIC | Age: 54
End: 2019-09-17

## 2019-09-17 ENCOUNTER — RESULTS ENCOUNTER (OUTPATIENT)
Dept: FAMILY MEDICINE CLINIC | Facility: CLINIC | Age: 54
End: 2019-09-17

## 2019-09-17 VITALS
WEIGHT: 162 LBS | HEART RATE: 82 BPM | OXYGEN SATURATION: 98 % | TEMPERATURE: 97.9 F | HEIGHT: 69 IN | RESPIRATION RATE: 16 BRPM | DIASTOLIC BLOOD PRESSURE: 96 MMHG | SYSTOLIC BLOOD PRESSURE: 155 MMHG | BODY MASS INDEX: 23.99 KG/M2

## 2019-09-17 DIAGNOSIS — I15.9 SECONDARY HYPERTENSION: ICD-10-CM

## 2019-09-17 DIAGNOSIS — F41.1 GAD (GENERALIZED ANXIETY DISORDER): ICD-10-CM

## 2019-09-17 DIAGNOSIS — J30.1 SEASONAL ALLERGIC RHINITIS DUE TO POLLEN: ICD-10-CM

## 2019-09-17 DIAGNOSIS — I15.9 SECONDARY HYPERTENSION: Primary | ICD-10-CM

## 2019-09-17 PROCEDURE — 99214 OFFICE O/P EST MOD 30 MIN: CPT | Performed by: FAMILY MEDICINE

## 2019-09-17 RX ORDER — ALPRAZOLAM 1 MG/1
TABLET ORAL
Qty: 75 TABLET | Refills: 2 | Status: SHIPPED | OUTPATIENT
Start: 2019-09-17 | End: 2019-12-16 | Stop reason: SDUPTHER

## 2019-09-17 NOTE — PATIENT INSTRUCTIONS
Exercise 30 minutes most days of the week  Sleep 6-8 hours each night if possible  Low fat, low cholesterol diet   we discussed prescribed medications and how to take them   make sure you get results of any labs/studies ordered today  Low glycemic index diet      Patients must be seen every 3 months for their presription medication review and refill required by [place] law.  Patient has been advised on the potential for addiction  and dependence to their prescribed scheduled medication.  JADEN was obtained today and reviewed. This was scanned into the patient's chart.  Labs results discussed in detail with the patient, if no recent labs were done, order placed today.  Plan to update vaccines if needed today.  I  reviewed health maintenance with the patient as part of my preventative care plan. I discussed preventative counseling with the patient in detail.

## 2019-09-17 NOTE — PROGRESS NOTES
Subjective   Chief Complaint:   Chief Complaint   Patient presents with   • Anxiety         History of Present Illness     The patient has read and signed the [facility] Controlled Substance Contract.  I will continue to see patient for regular follow up appointments.  They are well controlled on their medication.  JADEN has been reviewed by me and is updated every 3 months. The patient is aware of the potential for addiction and dependence.  We will add a discussion with him about the Xanax on a refill the Xanax 1 mg he takes 1  twice daily and then he can take an extra half a tablet as needed additional anxiety.  So he gets number 75/month with 2 refills.  He is 54 now I talked to him about a preventative physical exam we will go through his shot record and see if he wants a pneumonia shot and the Prevnar and shingles shot and the flu shot etc. and then he does need a colonoscopy so we can talk at that time to about set up a colonoscopy so at this time of undergoing give him his lab order and let them set that up in the near future.  Set up a physical later.bp later  155/96. 140/92, 140/90 and 136/88.  He is okay on his blood pressure pill namely the Avapro.  Set up a physical I had a discussion with him about the preventive shots and he really did not want any he did not want the flu shot nor did he want the shingles shot.  Go ahead and refer him to Dr. Khan for colonoscopy and he is can return in 2 to 3 to 4 weeks and do a complete physical and I talked about the referral to Dr. Khan etc  I went preventative exam with the patient in detail we talked about immunization and he did not want a shingles shot and he did not want a pneumonia or Prevnar and he definitely did not want the shingles shot and he did not want a flu shot either.  I ordered labs on him working to bring him back and do a preventative exam and discuss his labs and his cholesterol.  So I did do preventative counseling with this  "patient in detail.  And will continue to do so when we do the physical in the next 2 to 3 weeks.  I ordered a lab profile on him    Raphael Garcia 54 y.o. male who presents today for Medical Management of the below listed issues and medication refills.  No diagnosis found.     he has a problem list of   Patient Active Problem List   Diagnosis   • Hypertension   • JAMEL (generalized anxiety disorder)   • Allergic rhinitis due to pollen   .    he has been compliant with   Current Outpatient Medications:   •  ALPRAZolam (XANAX) 1 MG tablet, Take one tablet BID and may take an additional one half tablet at night prn, Disp: 75 tablet, Rfl: 2  •  cetirizine (zyrTEC) 10 MG tablet, Take 1 tablet by mouth Daily., Disp: 30 tablet, Rfl: 5  •  irbesartan (AVAPRO) 300 MG tablet, Take 1 tablet by mouth Daily., Disp: 30 tablet, Rfl: 5.  he denies medication side effects.        /96   Pulse 82   Temp 97.9 °F (36.6 °C)   Resp 16   Ht 175.3 cm (69\")   Wt 73.5 kg (162 lb)   SpO2 98%   BMI 23.92 kg/m²     Results for orders placed or performed in visit on 02/14/19   POC Influenza A / B   Result Value Ref Range    Rapid Influenza A Ag Negative Negative    Rapid Influenza B Ag Negative Negative    Internal Control Passed Passed    Lot Number [id number]     Expiration Date 9/21/2021              The following portions of the patient's history were reviewed and updated as appropriate: allergies, current medications, past family history, past medical history, past social history, past surgical history and problem list.    Review of Systems   Constitutional: Negative for activity change, appetite change, fatigue and unexpected weight change.   HENT: Negative for congestion and trouble swallowing.    Eyes: Negative for pain and visual disturbance.   Respiratory: Negative for chest tightness and shortness of breath.    Cardiovascular: Negative for chest pain and palpitations.   Gastrointestinal: Negative for abdominal pain " and blood in stool.   Endocrine: Negative for polyuria.   Genitourinary: Negative for decreased urine volume, difficulty urinating and frequency.   Musculoskeletal: Negative for arthralgias and back pain.   Skin: Negative for color change.   Neurological: Negative for syncope and speech difficulty.   Psychiatric/Behavioral: Negative for confusion and decreased concentration.       Objective   Physical Exam   Constitutional: He is oriented to person, place, and time. He appears well-developed and well-nourished.   HENT:   Mouth/Throat: Oropharynx is clear and moist.   Eyes: Pupils are equal, round, and reactive to light.   Neck: Normal range of motion. No thyromegaly present.   Cardiovascular: Normal rate and regular rhythm.   Pulmonary/Chest: Effort normal and breath sounds normal.   Abdominal: Soft. He exhibits no distension. There is no tenderness.   Musculoskeletal: He exhibits no edema, tenderness or deformity.   Neurological: He is alert and oriented to person, place, and time.   Skin: Skin is warm and dry.   Psychiatric: He has a normal mood and affect.       Assessment/Plan   There are no diagnoses linked to this encounter.             -Labs results discussed in detail with the patient, if no recent labs were done, order placed today.  Plan to update vaccines if needed today.  I  reviewed health maintenance with the patient as part of my preventative care plan. I discussed preventative counseling with the patient in detail.

## 2019-09-30 ENCOUNTER — TELEPHONE (OUTPATIENT)
Dept: FAMILY MEDICINE CLINIC | Facility: CLINIC | Age: 54
End: 2019-09-30

## 2019-09-30 DIAGNOSIS — I10 ESSENTIAL HYPERTENSION: ICD-10-CM

## 2019-09-30 RX ORDER — IRBESARTAN 300 MG/1
300 TABLET ORAL DAILY
Qty: 90 TABLET | Refills: 0 | Status: SHIPPED | OUTPATIENT
Start: 2019-09-30 | End: 2019-12-13 | Stop reason: SDUPTHER

## 2019-12-13 ENCOUNTER — TELEPHONE (OUTPATIENT)
Dept: FAMILY MEDICINE CLINIC | Facility: CLINIC | Age: 54
End: 2019-12-13

## 2019-12-13 DIAGNOSIS — I10 ESSENTIAL HYPERTENSION: ICD-10-CM

## 2019-12-13 RX ORDER — IRBESARTAN 300 MG/1
300 TABLET ORAL DAILY
Qty: 90 TABLET | Refills: 0 | Status: SHIPPED | OUTPATIENT
Start: 2019-12-13 | End: 2020-03-18 | Stop reason: SDUPTHER

## 2019-12-13 NOTE — TELEPHONE ENCOUNTER
Pt's wife called stating that pt needs losartan sent to [facility].

## 2019-12-16 ENCOUNTER — OFFICE VISIT (OUTPATIENT)
Dept: FAMILY MEDICINE CLINIC | Facility: CLINIC | Age: 54
End: 2019-12-16

## 2019-12-16 VITALS
WEIGHT: 164 LBS | SYSTOLIC BLOOD PRESSURE: 140 MMHG | BODY MASS INDEX: 24.29 KG/M2 | HEART RATE: 95 BPM | HEIGHT: 69 IN | DIASTOLIC BLOOD PRESSURE: 88 MMHG | OXYGEN SATURATION: 98 % | TEMPERATURE: 98 F | RESPIRATION RATE: 16 BRPM

## 2019-12-16 DIAGNOSIS — J30.1 SEASONAL ALLERGIC RHINITIS DUE TO POLLEN: ICD-10-CM

## 2019-12-16 DIAGNOSIS — F41.1 GAD (GENERALIZED ANXIETY DISORDER): ICD-10-CM

## 2019-12-16 DIAGNOSIS — I15.9 SECONDARY HYPERTENSION: Primary | ICD-10-CM

## 2019-12-16 PROCEDURE — 99213 OFFICE O/P EST LOW 20 MIN: CPT | Performed by: FAMILY MEDICINE

## 2019-12-16 RX ORDER — ALPRAZOLAM 1 MG/1
TABLET ORAL
Qty: 75 TABLET | Refills: 2 | Status: SHIPPED | OUTPATIENT
Start: 2019-12-16 | End: 2020-03-18 | Stop reason: SDUPTHER

## 2019-12-16 NOTE — PATIENT INSTRUCTIONS
Generalized Anxiety Disorder, Adult  Generalized anxiety disorder (JAMEL) is a mental health disorder. People with this condition constantly worry about everyday events. Unlike normal anxiety, worry related to JAMEL is not triggered by a specific event. These worries also do not fade or get better with time. JAMEL interferes with life functions, including relationships, work, and school.  JAMEL can vary from mild to severe. People with severe JAMEL can have intense waves of anxiety with physical symptoms (panic attacks).  What are the causes?  The exact cause of JAMEL is not known.  What increases the risk?  This condition is more likely to develop in:  · Women.  · People who have a family history of anxiety disorders.  · People who are very shy.  · People who experience very stressful life events, such as the death of a loved one.  · People who have a very stressful family environment.  What are the signs or symptoms?  People with JAMEL often worry excessively about many things in their lives, such as their health and family. They may also be overly concerned about:  · Doing well at work.  · Being on time.  · Natural disasters.  · Friendships.  Physical symptoms of JAMEL include:  · Fatigue.  · Muscle tension or having muscle twitches.  · Trembling or feeling shaky.  · Being easily startled.  · Feeling like your heart is pounding or racing.  · Feeling out of breath or like you cannot take a deep breath.  · Having trouble falling asleep or staying asleep.  · Sweating.  · Nausea, diarrhea, or irritable bowel syndrome (IBS).  · Headaches.  · Trouble concentrating or remembering facts.  · Restlessness.  · Irritability.  How is this diagnosed?  Your health care provider can diagnose JAMEL based on your symptoms and medical history. You will also have a physical exam. The health care provider will ask specific questions about your symptoms, including how severe they are, when they started, and if they come and go. Your health care  provider may ask you about your use of alcohol or drugs, including prescription medicines. Your health care provider may refer you to a mental health specialist for further evaluation.  Your health care provider will do a thorough examination and may perform additional tests to rule out other possible causes of your symptoms.  To be diagnosed with JAMEL, a person must have anxiety that:  · Is out of his or her control.  · Affects several different aspects of his or her life, such as work and relationships.  · Causes distress that makes him or her unable to take part in normal activities.  · Includes at least three physical symptoms of JAMEL, such as restlessness, fatigue, trouble concentrating, irritability, muscle tension, or sleep problems.  Before your health care provider can confirm a diagnosis of JAMEL, these symptoms must be present more days than they are not, and they must last for six months or longer.  How is this treated?  The following therapies are usually used to treat JAMEL:  · Medicine. Antidepressant medicine is usually prescribed for long-term daily control. Antianxiety medicines may be added in severe cases, especially when panic attacks occur.  · Talk therapy (psychotherapy). Certain types of talk therapy can be helpful in treating JAMEL by providing support, education, and guidance. Options include:  ? Cognitive behavioral therapy (CBT). People learn coping skills and techniques to ease their anxiety. They learn to identify unrealistic or negative thoughts and behaviors and to replace them with positive ones.  ? Acceptance and commitment therapy (ACT). This treatment teaches people how to be mindful as a way to cope with unwanted thoughts and feelings.  ? Biofeedback. This process trains you to manage your body's response (physiological response) through breathing techniques and relaxation methods. You will work with a therapist while machines are used to monitor your physical symptoms.  · Stress  management techniques. These include yoga, meditation, and exercise.  A mental health specialist can help determine which treatment is best for you. Some people see improvement with one type of therapy. However, other people require a combination of therapies.  Follow these instructions at home:  · Take over-the-counter and prescription medicines only as told by your health care provider.  · Try to maintain a normal routine.  · Try to anticipate stressful situations and allow extra time to manage them.  · Practice any stress management or self-calming techniques as taught by your health care provider.  · Do not punish yourself for setbacks or for not making progress.  · Try to recognize your accomplishments, even if they are small.  · Keep all follow-up visits as told by your health care provider. This is important.  Contact a health care provider if:  · Your symptoms do not get better.  · Your symptoms get worse.  · You have signs of depression, such as:  ? A persistently sad, cranky, or irritable mood.  ? Loss of enjoyment in activities that used to bring you dilcia.  ? Change in weight or eating.  ? Changes in sleeping habits.  ? Avoiding friends or family members.  ? Loss of energy for normal tasks.  ? Feelings of guilt or worthlessness.  Get help right away if:  · You have serious thoughts about hurting yourself or others.  If you ever feel like you may hurt yourself or others, or have thoughts about taking your own life, get help right away. You can go to your nearest emergency department or call:  · Your local emergency services (911 in the [place]  · A suicide crisis helpline, such as the National Suicide Prevention Lifeline at 1-293.844.8315. This is open 24 hours a day.  Summary  · Generalized anxiety disorder (JAMEL) is a mental health disorder that involves worry that is not triggered by a specific event.  · People with JAMEL often worry excessively about many things in their lives, such as their health and  family.  · JAMEL may cause physical symptoms such as restlessness, trouble concentrating, sleep problems, frequent sweating, nausea, diarrhea, headaches, and trembling or muscle twitching.  · A mental health specialist can help determine which treatment is best for you. Some people see improvement with one type of therapy. However, other people require a combination of therapies.  This information is not intended to replace advice given to you by your health care provider. Make sure you discuss any questions you have with your health care provider.  Document Released: 04/14/2014 Document Revised: 11/07/2017 Document Reviewed: 11/07/2017  [employer] Patient Education © 2019 [employer]  Labs results discussed in detail with the patient, if no recent labs were done, order placed today.  Plan to update vaccines if needed today.  I  reviewed health maintenance with the patient as part of my preventative care plan. I discussed preventative counseling with the patient in detail.  Refused flu shot  Made him sick last time

## 2019-12-16 NOTE — PROGRESS NOTES
"Subjective   Chief Complaint:   Chief Complaint   Patient presents with   • Anxiety         History of Present Illness comes in today to refill his Xanax.  He is on 1 mg p.o. twice daily as needed anxiety and uses a 1/2 tablet at night as needed anxiety.  He had 75 tablets with 2 refills and this works out well for him.  He also is on Avapro 300 mg 1 a day for hypertension and his blood pressure was a little bit elevated today because he ran out of it 2 days ago has not had the Avapro for 2 days blood pressure that I got today was 134/84.  But he is not had his medicine for 2 or 3 days so that would make a difference was blood pressures up but most time at home you are okay on your blood pressure.  So 90 days of Avapro was at the drugstore forming plus some send in the Xanax to the pharmacy today.  He does well on these without any problems.            Raphael POWELL Jose 54 y.o. male who presents today for Medical Management of the below listed issues and medication refills.    ICD-10-CM ICD-9-CM   1. Secondary hypertension I15.9 405.99   2. JAMEL (generalized anxiety disorder) F41.1 300.02   3. Seasonal allergic rhinitis due to pollen J30.1 477.0        he has a problem list of   Patient Active Problem List   Diagnosis   • Hypertension   • JAMEL (generalized anxiety disorder)   • Allergic rhinitis due to pollen   .    he has been compliant with   Current Outpatient Medications:   •  ALPRAZolam (XANAX) 1 MG tablet, Take one tablet BID and may take an additional one half tablet at night prn, Disp: 75 tablet, Rfl: 2  •  cetirizine (zyrTEC) 10 MG tablet, Take 1 tablet by mouth Daily., Disp: 30 tablet, Rfl: 5  •  irbesartan (AVAPRO) 300 MG tablet, Take 1 tablet by mouth Daily., Disp: 90 tablet, Rfl: 0.  he denies medication side effects.        /88   Pulse 95   Temp 98 °F (36.7 °C)   Resp 16   Ht 175.3 cm (69\")   Wt 74.4 kg (164 lb)   SpO2 98%   BMI 24.22 kg/m²     Results for orders placed or performed in " visit on 02/14/19   POC Influenza A / B   Result Value Ref Range    Rapid Influenza A Ag Negative Negative    Rapid Influenza B Ag Negative Negative    Internal Control Passed Passed    Lot Number [id number]     Expiration Date 9/21/2021        The following portions of the patient's history were reviewed and updated as appropriate: allergies, past family history, past medical history, past social history, past surgical history and problem list.      he has a history of   Patient Active Problem List   Diagnosis   • Hypertension   • JAMEL (generalized anxiety disorder)   • Allergic rhinitis due to pollen       Review of Systems   Constitutional: Negative for activity change, appetite change and unexpected weight change.   HENT: Negative for nosebleeds.    Eyes: Negative for visual disturbance.   Respiratory: Negative for chest tightness and shortness of breath.    Cardiovascular: Negative for chest pain and palpitations.   Gastrointestinal: Negative for abdominal pain.   Genitourinary: Negative for difficulty urinating.   Musculoskeletal: Negative for back pain.   Skin: Negative for color change.   Neurological: Negative for syncope and speech difficulty.   Psychiatric/Behavioral: Negative for confusion and decreased concentration.       Objective   Physical Exam   Constitutional: He is oriented to person, place, and time. He appears well-developed and well-nourished.   HENT:   Head: Atraumatic.   Mouth/Throat: Oropharynx is clear and moist.   Eyes: Pupils are equal, round, and reactive to light. EOM are normal.   Neck: Normal range of motion. Neck supple. No thyromegaly present.   Cardiovascular: Normal rate and regular rhythm.   Pulmonary/Chest: Effort normal and breath sounds normal.   Abdominal: Soft.   Musculoskeletal: Normal range of motion.   Neurological: He is alert and oriented to person, place, and time.   Skin: Skin is warm and dry.   Psychiatric: He has a normal mood and affect. His behavior is normal.    Nursing note and vitals reviewed.      Assessment/Plan   Raphael was seen today for anxiety.    Diagnoses and all orders for this visit:    Secondary hypertension    JAMEL (generalized anxiety disorder)    Seasonal allergic rhinitis due to pollen      Patient takes Xanax 1 mg twice daily as needed anxiety and half at night as needed for anxiety use these very sparingly and use only if necessary.  Patient understands that he is going to use Xanax only if necessary for anxiety but usually takes 1 twice a day.  His blood pressure slightly elevated today because he ran out of his medicine 2 days ago and is going to the drugstore now and pick that up.  Check in 6 months

## 2020-03-18 ENCOUNTER — OFFICE VISIT (OUTPATIENT)
Dept: FAMILY MEDICINE CLINIC | Facility: CLINIC | Age: 55
End: 2020-03-18

## 2020-03-18 VITALS
WEIGHT: 168 LBS | RESPIRATION RATE: 16 BRPM | HEART RATE: 85 BPM | OXYGEN SATURATION: 83 % | DIASTOLIC BLOOD PRESSURE: 102 MMHG | BODY MASS INDEX: 24.88 KG/M2 | HEIGHT: 69 IN | SYSTOLIC BLOOD PRESSURE: 170 MMHG | TEMPERATURE: 97.5 F

## 2020-03-18 DIAGNOSIS — F41.1 GAD (GENERALIZED ANXIETY DISORDER): ICD-10-CM

## 2020-03-18 DIAGNOSIS — I15.9 SECONDARY HYPERTENSION: ICD-10-CM

## 2020-03-18 DIAGNOSIS — J30.1 SEASONAL ALLERGIC RHINITIS DUE TO POLLEN: ICD-10-CM

## 2020-03-18 DIAGNOSIS — I10 ESSENTIAL HYPERTENSION: ICD-10-CM

## 2020-03-18 PROCEDURE — 99214 OFFICE O/P EST MOD 30 MIN: CPT | Performed by: FAMILY MEDICINE

## 2020-03-18 RX ORDER — IRBESARTAN 300 MG/1
300 TABLET ORAL DAILY
Qty: 90 TABLET | Refills: 1 | Status: SHIPPED | OUTPATIENT
Start: 2020-03-18 | End: 2020-09-16

## 2020-03-18 RX ORDER — ALPRAZOLAM 1 MG/1
TABLET ORAL
Qty: 75 TABLET | Refills: 2 | Status: SHIPPED | OUTPATIENT
Start: 2020-03-18 | End: 2020-06-15 | Stop reason: SDUPTHER

## 2020-03-18 RX ORDER — CETIRIZINE HYDROCHLORIDE 10 MG/1
10 TABLET ORAL DAILY
Qty: 90 TABLET | Refills: 1 | Status: SHIPPED | OUTPATIENT
Start: 2020-03-18 | End: 2020-05-26

## 2020-03-18 NOTE — PROGRESS NOTES
Subjective   Chief Complaint:   Chief Complaint   Patient presents with   • Hypertension   • Anxiety   • Allergic Rhinitis         History of Present Illness       The patient has read and signed the [facility] Controlled Substance Contract.  I will continue to see patient for regular follow up appointments.  They are well controlled on their medication.  JADEN has been reviewed by me and is updated every 3 months. The patient is aware of the potential for addiction and dependence.  He is here today to refill medications he is going to refill his Xanax 1 mg he takes 1 tablet twice daily and may take an additional 1/2 tablet at night as needed anxiety he gets 75/month with 2 refills and this works well for his anxiety.   continue the same dose of Xanax 1 mg take 1 tablet twice daily and may take an additional 1/2 tablet at night as needed and again this works very well for his anxiety no side effects.  And then also been a refill of Zyrtec for his allergies 10 mg 1 a day and for hypertension I am in a refill Avapro 300 mg take 1 a day  90 with a refill.BP was looking for his lab results and is been 2 or 3 years at least since he is had labs ordered a CMP profile with a PSA on him and also he needs to see 1 of the doctors about doing a colonoscopy which he has not had yet and he is  54 years old.  See me after he gets her labs and I am going to go ahead and order the labs today but I wanted to do them fasting and you need to do a screening colonoscopy somata refer you to the group at [facility] for colonoscopy and then I want you to make an appoint with me because you need a prostate exam and I will go over the labs with him in the next few weeks      Past Medical History:   Diagnosis Date   • JAMEL (generalized anxiety disorder)    • History of colonoscopy     never   • Hypertension    • Screening for prostate cancer     never        Raphael Garcia 54 y.o. male who presents today for Medical Management  "of the below listed issues and medication refills.    ICD-10-CM ICD-9-CM   1. Essential hypertension I10 401.9   2. Secondary hypertension I15.9 405.99   3. JAMEL (generalized anxiety disorder) F41.1 300.02   4. Seasonal allergic rhinitis due to pollen J30.1 477.0        he has a problem list of   Patient Active Problem List   Diagnosis   • Hypertension   • JAMEL (generalized anxiety disorder)   • Allergic rhinitis due to pollen   .    he has been compliant with   Current Outpatient Medications:   •  ALPRAZolam (Xanax) 1 MG tablet, Take one tablet BID and may take an additional one half tablet at night prn, Disp: 75 tablet, Rfl: 2  •  cetirizine (zyrTEC) 10 MG tablet, Take 1 tablet by mouth Daily., Disp: 90 tablet, Rfl: 1  •  irbesartan (AVAPRO) 300 MG tablet, Take 1 tablet by mouth Daily., Disp: 90 tablet, Rfl: 1.  he denies medication side effects.        BP (!) 170/102   Pulse 85   Temp 97.5 °F (36.4 °C)   Resp 16   Ht 175.3 cm (69\")   Wt 76.2 kg (168 lb)   SpO2 (!) 83%   BMI 24.81 kg/m²     Results for orders placed or performed in visit on 02/14/19   POC Influenza A / B   Result Value Ref Range    Rapid Influenza A Ag Negative Negative    Rapid Influenza B Ag Negative Negative    Internal Control Passed Passed    Lot Number [id number]     Expiration Date 9/21/2021        The following portions of the patient's history were reviewed and updated as appropriate: allergies, current medications, past family history, past medical history, past social history, past surgical history and problem list.      he has a history of   Patient Active Problem List   Diagnosis   • Hypertension   • JAMEL (generalized anxiety disorder)   • Allergic rhinitis due to pollen       Review of Systems   Constitutional: Negative for activity change, appetite change and unexpected weight change.   Eyes: Negative for visual disturbance.   Respiratory: Negative for chest tightness and shortness of breath.    Cardiovascular: Negative for chest " pain and palpitations.   Skin: Negative for color change.   Neurological: Negative for syncope and speech difficulty.   Psychiatric/Behavioral: Negative for confusion and decreased concentration.       Objective   Physical Exam   Constitutional: He is oriented to person, place, and time. He appears well-developed and well-nourished.   HENT:   Head: Atraumatic.   Mouth/Throat: Oropharynx is clear and moist.   Eyes: Pupils are equal, round, and reactive to light. EOM are normal.   Neck: Normal range of motion. Neck supple. No thyromegaly present.   Cardiovascular: Normal rate and regular rhythm.   Pulmonary/Chest: Effort normal and breath sounds normal.   Abdominal: Soft.   Musculoskeletal: Normal range of motion.   Neurological: He is alert and oriented to person, place, and time.   Skin: Skin is warm and dry.   Psychiatric: He has a normal mood and affect. His behavior is normal.   Nursing note and vitals reviewed.      Assessment/Plan   Raphael was seen today for hypertension, anxiety and allergic rhinitis.    Diagnoses and all orders for this visit:    Essential hypertension  -     irbesartan (AVAPRO) 300 MG tablet; Take 1 tablet by mouth Daily.  -     Comprehensive metabolic panel  -     Lipid panel  -     CBC and Differential  -     TSH  -     PSA    Secondary hypertension  -     ALPRAZolam (Xanax) 1 MG tablet; Take one tablet BID and may take an additional one half tablet at night prn  -     Comprehensive metabolic panel  -     Lipid panel  -     CBC and Differential  -     TSH  -     PSA    JAMEL (generalized anxiety disorder)  -     ALPRAZolam (Xanax) 1 MG tablet; Take one tablet BID and may take an additional one half tablet at night prn  -     Comprehensive metabolic panel  -     Lipid panel  -     CBC and Differential  -     TSH  -     PSA    Seasonal allergic rhinitis due to pollen  -     ALPRAZolam (Xanax) 1 MG tablet; Take one tablet BID and may take an additional one half tablet at night prn  -      Comprehensive metabolic panel  -     Lipid panel  -     CBC and Differential  -     TSH  -     PSA    Other orders  -     cetirizine (zyrTEC) 10 MG tablet; Take 1 tablet by mouth Daily.

## 2020-03-18 NOTE — PATIENT INSTRUCTIONS
Allergic Rhinitis, Adult  Allergic rhinitis is a reaction to allergens in the air. Allergens are tiny specks (particles) in the air that cause your body to have an allergic reaction. This condition cannot be passed from person to person (is not contagious). Allergic rhinitis cannot be cured, but it can be controlled.  There are two types of allergic rhinitis:  · Seasonal. This type is also called hay fever. It happens only during certain times of the year.  · Perennial. This type can happen at any time of the year.  What are the causes?  This condition may be caused by:  · Pollen from grasses, trees, and weeds.  · House dust mites.  · Pet dander.  · Mold.  What are the signs or symptoms?  Symptoms of this condition include:  · Sneezing.  · Runny or stuffy nose (nasal congestion).  · A lot of mucus in the back of the throat (postnasal drip).  · Itchy nose.  · Tearing of the eyes.  · Trouble sleeping.  · Being sleepy during day.  How is this treated?  There is no cure for this condition. You should avoid things that trigger your symptoms (allergens). Treatment can help to relieve symptoms. This may include:  · Medicines that block allergy symptoms, such as antihistamines. These may be given as a shot, nasal spray, or pill.  · Shots that are given until your body becomes less sensitive to the allergen (desensitization).  · Stronger medicines, if all other treatments have not worked.  Follow these instructions at home:  Avoiding allergens    · Find out what you are allergic to. Common allergens include smoke, dust, and pollen.  · Avoid them if you can. These are some of the things that you can do to avoid allergens:  ? Replace carpet with wood, tile, or vinyl nolan. Carpet can trap dander and dust.  ? Clean any mold found in the home.  ? Do not smoke. Do not allow smoking in your home.  ? Change your heating and air conditioning filter at least once a month.  ? During allergy season:  § Keep windows closed as much as  you can. If possible, use air conditioning when there is a lot of pollen in the air.  § Use a special filter for allergies with your furnace and air conditioner.  § Plan outdoor activities when pollen counts are lowest. This is usually during the early morning or evening hours.  § If you do go outdoors when pollen count is high, wear a special mask for people with allergies.  § When you come indoors, take a shower and change your clothes before sitting on furniture or bedding.  General instructions  · Do not use fans in your home.  · Do not hang clothes outside to dry.  · Wear sunglasses to keep pollen out of your eyes.  · Wash your hands right away after you touch household pets.  · Take over-the-counter and prescription medicines only as told by your doctor.  · Keep all follow-up visits as told by your doctor. This is important.  Contact a doctor if:  · You have a fever.  · You have a cough that does not go away (is persistent).  · You start to make whistling sounds when you breathe (wheeze).  · Your symptoms do not get better with treatment.  · You have thick fluid coming from your nose.  · You start to have nosebleeds.  Get help right away if:  · Your tongue or your lips are swollen.  · You have trouble breathing.  · You feel dizzy or you feel like you are going to pass out (faint).  · You have cold sweats.  Summary  · Allergic rhinitis is a reaction to allergens in the air.  · This condition may be caused by allergens. These include pollen, dust mites, pet dander, and mold.  · Symptoms include a runny, itchy nose, sneezing, or tearing eyes. You may also have trouble sleeping or feel sleepy during the day.  · Treatment includes taking medicines and avoiding allergens. You may also get shots or take stronger medicines.  · Get help if you have a fever or a cough that does not stop. Get help right away if you are short of breath.  This information is not intended to replace advice given to you by your health care  provider. Make sure you discuss any questions you have with your health care provider.  Document Released: 04/18/2012 Document Revised: 07/09/2019 Document Reviewed: 07/09/2019  [employer] Interactive Patient Education © 2020 [employer]  Generalized Anxiety Disorder, Adult  Generalized anxiety disorder (JAMEL) is a mental health disorder. People with this condition constantly worry about everyday events. Unlike normal anxiety, worry related to JAMEL is not triggered by a specific event. These worries also do not fade or get better with time. JAMEL interferes with life functions, including relationships, work, and school.  JAMEL can vary from mild to severe. People with severe JAMEL can have intense waves of anxiety with physical symptoms (panic attacks).  What are the causes?  The exact cause of JAMEL is not known.  What increases the risk?  This condition is more likely to develop in:  · Women.  · People who have a family history of anxiety disorders.  · People who are very shy.  · People who experience very stressful life events, such as the death of a loved one.  · People who have a very stressful family environment.  What are the signs or symptoms?  People with JAMEL often worry excessively about many things in their lives, such as their health and family. They may also be overly concerned about:  · Doing well at work.  · Being on time.  · Natural disasters.  · Friendships.  Physical symptoms of JAMEL include:  · Fatigue.  · Muscle tension or having muscle twitches.  · Trembling or feeling shaky.  · Being easily startled.  · Feeling like your heart is pounding or racing.  · Feeling out of breath or like you cannot take a deep breath.  · Having trouble falling asleep or staying asleep.  · Sweating.  · Nausea, diarrhea, or irritable bowel syndrome (IBS).  · Headaches.  · Trouble concentrating or remembering facts.  · Restlessness.  · Irritability.  How is this diagnosed?  Your health care provider can diagnose JAMEL based on your  symptoms and medical history. You will also have a physical exam. The health care provider will ask specific questions about your symptoms, including how severe they are, when they started, and if they come and go. Your health care provider may ask you about your use of alcohol or drugs, including prescription medicines. Your health care provider may refer you to a mental health specialist for further evaluation.  Your health care provider will do a thorough examination and may perform additional tests to rule out other possible causes of your symptoms.  To be diagnosed with JAMEL, a person must have anxiety that:  · Is out of his or her control.  · Affects several different aspects of his or her life, such as work and relationships.  · Causes distress that makes him or her unable to take part in normal activities.  · Includes at least three physical symptoms of JAMEL, such as restlessness, fatigue, trouble concentrating, irritability, muscle tension, or sleep problems.  Before your health care provider can confirm a diagnosis of JAMEL, these symptoms must be present more days than they are not, and they must last for six months or longer.  How is this treated?  The following therapies are usually used to treat JAMEL:  · Medicine. Antidepressant medicine is usually prescribed for long-term daily control. Antianxiety medicines may be added in severe cases, especially when panic attacks occur.  · Talk therapy (psychotherapy). Certain types of talk therapy can be helpful in treating JAMEL by providing support, education, and guidance. Options include:  ? Cognitive behavioral therapy (CBT). People learn coping skills and techniques to ease their anxiety. They learn to identify unrealistic or negative thoughts and behaviors and to replace them with positive ones.  ? Acceptance and commitment therapy (ACT). This treatment teaches people how to be mindful as a way to cope with unwanted thoughts and feelings.  ? Biofeedback. This  process trains you to manage your body's response (physiological response) through breathing techniques and relaxation methods. You will work with a therapist while machines are used to monitor your physical symptoms.  · Stress management techniques. These include yoga, meditation, and exercise.  A mental health specialist can help determine which treatment is best for you. Some people see improvement with one type of therapy. However, other people require a combination of therapies.  Follow these instructions at home:  · Take over-the-counter and prescription medicines only as told by your health care provider.  · Try to maintain a normal routine.  · Try to anticipate stressful situations and allow extra time to manage them.  · Practice any stress management or self-calming techniques as taught by your health care provider.  · Do not punish yourself for setbacks or for not making progress.  · Try to recognize your accomplishments, even if they are small.  · Keep all follow-up visits as told by your health care provider. This is important.  Contact a health care provider if:  · Your symptoms do not get better.  · Your symptoms get worse.  · You have signs of depression, such as:  ? A persistently sad, cranky, or irritable mood.  ? Loss of enjoyment in activities that used to bring you dilcia.  ? Change in weight or eating.  ? Changes in sleeping habits.  ? Avoiding friends or family members.  ? Loss of energy for normal tasks.  ? Feelings of guilt or worthlessness.  Get help right away if:  · You have serious thoughts about hurting yourself or others.  If you ever feel like you may hurt yourself or others, or have thoughts about taking your own life, get help right away. You can go to your nearest emergency department or call:  · Your local emergency services (911 in the [place]  · A suicide crisis helpline, such as the National Suicide Prevention Lifeline at 1-243.371.3150. This is open 24 hours a  day.  Summary  · Generalized anxiety disorder (JAMEL) is a mental health disorder that involves worry that is not triggered by a specific event.  · People with JAMEL often worry excessively about many things in their lives, such as their health and family.  · JAMEL may cause physical symptoms such as restlessness, trouble concentrating, sleep problems, frequent sweating, nausea, diarrhea, headaches, and trembling or muscle twitching.  · A mental health specialist can help determine which treatment is best for you. Some people see improvement with one type of therapy. However, other people require a combination of therapies.  This information is not intended to replace advice given to you by your health care provider. Make sure you discuss any questions you have with your health care provider.  Document Released: 04/14/2014 Document Revised: 01/07/2020 Document Reviewed: 11/07/2017  [employer] Interactive Patient Education © 2020 [employer]  Hypertension, Adult  Hypertension is another name for high blood pressure. High blood pressure forces your heart to work harder to pump blood. This can cause problems over time.  There are two numbers in a blood pressure reading. There is a top number (systolic) over a bottom number (diastolic). It is best to have a blood pressure that is below 120/80. Healthy choices can help lower your blood pressure, or you may need medicine to help lower it.  What are the causes?  The cause of this condition is not known. Some conditions may be related to high blood pressure.  What increases the risk?  · Smoking.  · Having type 2 diabetes mellitus, high cholesterol, or both.  · Not getting enough exercise or physical activity.  · Being overweight.  · Having too much fat, sugar, calories, or salt (sodium) in your diet.  · Drinking too much alcohol.  · Having long-term (chronic) kidney disease.  · Having a family history of high blood pressure.  · Age. Risk increases with age.  · Race. You may be at  higher risk if you are .  · Gender. Men are at higher risk than women before age 45. After age 65, women are at higher risk than men.  · Having obstructive sleep apnea.  · Stress.  What are the signs or symptoms?  · High blood pressure may not cause symptoms. Very high blood pressure (hypertensive crisis) may cause:  ? Headache.  ? Feelings of worry or nervousness (anxiety).  ? Shortness of breath.  ? Nosebleed.  ? A feeling of being sick to your stomach (nausea).  ? Throwing up (vomiting).  ? Changes in how you see.  ? Very bad chest pain.  ? Seizures.  How is this treated?  · This condition is treated by making healthy lifestyle changes, such as:  ? Eating healthy foods.  ? Exercising more.  ? Drinking less alcohol.  · Your health care provider may prescribe medicine if lifestyle changes are not enough to get your blood pressure under control, and if:  ? Your top number is above 130.  ? Your bottom number is above 80.  · Your personal target blood pressure may vary.  Follow these instructions at home:  Eating and drinking    · If told, follow the DASH eating plan. To follow this plan:  ? Fill one half of your plate at each meal with fruits and vegetables.  ? Fill one fourth of your plate at each meal with whole grains. Whole grains include whole-wheat pasta, brown rice, and whole-grain bread.  ? Eat or drink low-fat dairy products, such as skim milk or low-fat yogurt.  ? Fill one fourth of your plate at each meal with low-fat (lean) proteins. Low-fat proteins include fish, chicken without skin, eggs, beans, and tofu.  ? Avoid fatty meat, cured and processed meat, or chicken with skin.  ? Avoid pre-made or processed food.  · Eat less than 1,500 mg of salt each day.  · Do not drink alcohol if:  ? Your doctor tells you not to drink.  ? You are pregnant, may be pregnant, or are planning to become pregnant.  · If you drink alcohol:  ? Limit how much you use to:  § 0-1 drink a day for women.  § 0-2  drinks a day for men.  ? Be aware of how much alcohol is in your drink. In the [place] one drink equals one 12 oz bottle of beer (355 mL), one 5 oz glass of wine (148 mL), or one 1½ oz glass of hard liquor (44 mL).  Lifestyle    · Work with your doctor to stay at a healthy weight or to lose weight. Ask your doctor what the best weight is for you.  · Get at least 30 minutes of exercise most days of the week. This may include walking, swimming, or biking.  · Get at least 30 minutes of exercise that strengthens your muscles (resistance exercise) at least 3 days a week. This may include lifting weights or doing Pilates.  · Do not use any products that contain nicotine or tobacco, such as cigarettes, e-cigarettes, and chewing tobacco. If you need help quitting, ask your doctor.  · Check your blood pressure at home as told by your doctor.  · Keep all follow-up visits as told by your doctor. This is important.  Medicines  · Take over-the-counter and prescription medicines only as told by your doctor. Follow directions carefully.  · Do not skip doses of blood pressure medicine. The medicine does not work as well if you skip doses. Skipping doses also puts you at risk for problems.  · Ask your doctor about side effects or reactions to medicines that you should watch for.  Contact a doctor if you:  · Think you are having a reaction to the medicine you are taking.  · Have headaches that keep coming back (recurring).  · Feel dizzy.  · Have swelling in your ankles.  · Have trouble with your vision.  Get help right away if you:  · Get a very bad headache.  · Start to feel mixed up (confused).  · Feel weak or numb.  · Feel faint.  · Have very bad pain in your:  ? Chest.  ? Belly (abdomen).  · Throw up more than once.  · Have trouble breathing.  Summary  · Hypertension is another name for high blood pressure.  · High blood pressure forces your heart to work harder to pump blood.  · For most people, a normal blood pressure is less  than 120/80.  · Making healthy choices can help lower blood pressure. If your blood pressure does not get lower with healthy choices, you may need to take medicine.  This information is not intended to replace advice given to you by your health care provider. Make sure you discuss any questions you have with your health care provider.  Document Released: 06/05/2009 Document Revised: 08/28/2019 Document Reviewed: 08/28/2019  [employer] Interactive Patient Education © 2020 [employer]

## 2020-03-18 NOTE — PROGRESS NOTES
"Subjective   Chief Complaint: No chief complaint on file.        History of Present Illness     The patient has read and signed the [facility] Controlled Substance Contract.  I will continue to see patient for regular follow up appointments.  They are well controlled on their medication.  JADEN has been reviewed by me and is updated every 3 months. The patient is aware of the potential for addiction and dependence.        Past Medical History:   Diagnosis Date   • JAMEL (generalized anxiety disorder)    • History of colonoscopy     never   • Hypertension    • Screening for prostate cancer     never        Raphael Garcia 54 y.o. male who presents today for Medical Management of the below listed issues and medication refills.    ICD-10-CM ICD-9-CM   1. Essential hypertension I10 401.9   2. Secondary hypertension I15.9 405.99   3. JAMEL (generalized anxiety disorder) F41.1 300.02   4. Seasonal allergic rhinitis due to pollen J30.1 477.0        he has a problem list of   Patient Active Problem List   Diagnosis   • Hypertension   • JAMEL (generalized anxiety disorder)   • Allergic rhinitis due to pollen   .    he has been compliant with   Current Outpatient Medications:   •  ALPRAZolam (XANAX) 1 MG tablet, Take one tablet BID and may take an additional one half tablet at night prn, Disp: 75 tablet, Rfl: 2  •  cetirizine (zyrTEC) 10 MG tablet, Take 1 tablet by mouth Daily., Disp: 30 tablet, Rfl: 5  •  irbesartan (AVAPRO) 300 MG tablet, Take 1 tablet by mouth Daily., Disp: 90 tablet, Rfl: 0.  he denies medication side effects.        BP (!) 170/102   Pulse 85   Temp 97.5 °F (36.4 °C)   Resp 16   Ht 175.3 cm (69\")   Wt 76.2 kg (168 lb)   SpO2 (!) 83%   BMI 24.81 kg/m²     Results for orders placed or performed in visit on 02/14/19   POC Influenza A / B   Result Value Ref Range    Rapid Influenza A Ag Negative Negative    Rapid Influenza B Ag Negative Negative    Internal Control Passed Passed    Lot Number " [id number]     Expiration Date 9/21/2021        {Common H&P Review Areas:46482}      he has a history of   Patient Active Problem List   Diagnosis   • Hypertension   • JAMEL (generalized anxiety disorder)   • Allergic rhinitis due to pollen       Review of Systems    Objective   Physical Exam    Assessment/Plan   Diagnoses and all orders for this visit:    Essential hypertension    Secondary hypertension    JAMEL (generalized anxiety disorder)    Seasonal allergic rhinitis due to pollen

## 2020-05-16 ENCOUNTER — RESULTS ENCOUNTER (OUTPATIENT)
Dept: FAMILY MEDICINE CLINIC | Facility: CLINIC | Age: 55
End: 2020-05-16

## 2020-05-16 DIAGNOSIS — I15.9 SECONDARY HYPERTENSION: ICD-10-CM

## 2020-05-16 DIAGNOSIS — J30.1 SEASONAL ALLERGIC RHINITIS DUE TO POLLEN: ICD-10-CM

## 2020-05-16 DIAGNOSIS — F41.1 GAD (GENERALIZED ANXIETY DISORDER): ICD-10-CM

## 2020-05-26 RX ORDER — CETIRIZINE HYDROCHLORIDE 10 MG/1
TABLET ORAL
Qty: 30 TABLET | Refills: 4 | Status: SHIPPED | OUTPATIENT
Start: 2020-05-26 | End: 2021-07-02

## 2020-06-15 ENCOUNTER — OFFICE VISIT (OUTPATIENT)
Dept: FAMILY MEDICINE CLINIC | Facility: CLINIC | Age: 55
End: 2020-06-15

## 2020-06-15 VITALS
RESPIRATION RATE: 16 BRPM | SYSTOLIC BLOOD PRESSURE: 147 MMHG | HEIGHT: 69 IN | OXYGEN SATURATION: 95 % | WEIGHT: 165 LBS | BODY MASS INDEX: 24.44 KG/M2 | DIASTOLIC BLOOD PRESSURE: 93 MMHG | TEMPERATURE: 98.2 F | HEART RATE: 80 BPM

## 2020-06-15 DIAGNOSIS — J30.1 SEASONAL ALLERGIC RHINITIS DUE TO POLLEN: ICD-10-CM

## 2020-06-15 DIAGNOSIS — I15.9 SECONDARY HYPERTENSION: ICD-10-CM

## 2020-06-15 DIAGNOSIS — F41.1 GAD (GENERALIZED ANXIETY DISORDER): ICD-10-CM

## 2020-06-15 PROCEDURE — 99213 OFFICE O/P EST LOW 20 MIN: CPT | Performed by: FAMILY MEDICINE

## 2020-06-15 RX ORDER — ALPRAZOLAM 1 MG/1
TABLET ORAL
Qty: 75 TABLET | Refills: 2 | Status: SHIPPED | OUTPATIENT
Start: 2020-06-15 | End: 2020-09-16 | Stop reason: SDUPTHER

## 2020-06-15 NOTE — PROGRESS NOTES
Subjective   Chief Complaint: med refill, anxiety      History of Present Illness   The patient has read and signed the [facility] Controlled Substance Contract.  I will continue to see patient for regular follow up appointments.  They are well controlled on their medication.  JADEN has been reviewed by me and is updated every 3 months. The patient is aware of the potential for addiction and dependence.  Takes Xanax 1 mg twice daily and an extra half of a 1 mg as needed anxiety.  This dose works well for his anxiety.  Side effects with this dose of Xanax.  Pressure today was 147/93 before he leaves home and take his blood pressure again.  Lopressor is on Avapro 300 mg daily.  Okay on his lab order he can do that next time.  We discussed his colonoscopy is going to wait and discuss at next visit.  Patient is going to call me before he comes in next time and I will try to go ahead and order the labs so he can have them the next time he comes in he would rather wait and order the labs when he comes in next time so he is going to call her back and order the labs next time        Past Medical History:   Diagnosis Date   • JAMEL (generalized anxiety disorder)    • History of colonoscopy     never   • Hypertension    • Screening for prostate cancer     never        Raphael Garcia 55 y.o. male who presents today for Medical Management of the below listed issues and medication refills.    ICD-10-CM ICD-9-CM   1. Secondary hypertension I15.9 405.99   2. JAMEL (generalized anxiety disorder) F41.1 300.02   3. Seasonal allergic rhinitis due to pollen J30.1 477.0        he has a problem list of   Patient Active Problem List   Diagnosis   • Hypertension   • JAMEL (generalized anxiety disorder)   • Allergic rhinitis due to pollen   .    he has been compliant with   Current Outpatient Medications:   •  ALPRAZolam (Xanax) 1 MG tablet, Take one tablet BID and may take an additional one half tablet at night prn, Disp: 75 tablet,  "Rfl: 2  •  cetirizine (zyrTEC) 10 MG tablet, TAKE ONE TABLET BY MOUTH DAILY, Disp: 30 tablet, Rfl: 4  •  irbesartan (AVAPRO) 300 MG tablet, Take 1 tablet by mouth Daily., Disp: 90 tablet, Rfl: 1.  he denies medication side effects.        /93   Pulse 80   Temp 98.2 °F (36.8 °C)   Resp 16   Ht 175.3 cm (69\")   Wt 74.8 kg (165 lb)   SpO2 95%   BMI 24.37 kg/m²     Results for orders placed or performed in visit on 02/14/19   POC Influenza A / B   Result Value Ref Range    Rapid Influenza A Ag Negative Negative    Rapid Influenza B Ag Negative Negative    Internal Control Passed Passed    Lot Number [id number]     Expiration Date 9/21/2021        The following portions of the patient's history were reviewed and updated as appropriate: allergies, current medications, past family history, past medical history, past social history, past surgical history and problem list.      he has a history of   Patient Active Problem List   Diagnosis   • Hypertension   • JAMEL (generalized anxiety disorder)   • Allergic rhinitis due to pollen       Review of Systems   Constitutional: Negative for activity change, appetite change and unexpected weight change.   Eyes: Negative for visual disturbance.   Respiratory: Negative for chest tightness and shortness of breath.    Cardiovascular: Negative for chest pain and palpitations.   Skin: Negative for color change.   Neurological: Negative for syncope and speech difficulty.   Psychiatric/Behavioral: Negative for confusion and decreased concentration. The patient is nervous/anxious.        Objective   Physical Exam   Constitutional: He is oriented to person, place, and time. He appears well-developed and well-nourished. No distress.   HENT:   Head: Atraumatic.   Mouth/Throat: Oropharynx is clear and moist.   Eyes: Pupils are equal, round, and reactive to light. EOM are normal.   Neck: Normal range of motion. Neck supple. No thyromegaly present.   Cardiovascular: Normal rate and " regular rhythm.   Pulmonary/Chest: Effort normal and breath sounds normal. No respiratory distress.   Abdominal: Soft.   Musculoskeletal: Normal range of motion.   Neurological: He is alert and oriented to person, place, and time.   Skin: Skin is warm and dry.   Psychiatric: He has a normal mood and affect. His behavior is normal.   Nursing note and vitals reviewed.      Assessment/Plan   Diagnoses and all orders for this visit:    Secondary hypertension  -     ALPRAZolam (Xanax) 1 MG tablet; Take one tablet BID and may take an additional one half tablet at night prn    JAMEL (generalized anxiety disorder)  -     ALPRAZolam (Xanax) 1 MG tablet; Take one tablet BID and may take an additional one half tablet at night prn    Seasonal allergic rhinitis due to pollen  -     ALPRAZolam (Xanax) 1 MG tablet; Take one tablet BID and may take an additional one half tablet at night prn      Labs results discussed in detail with the patient, if no recent labs were done, order placed today.  Plan to update vaccines if needed today.  I  reviewed health maintenance with the patient as part of my preventative care plan. I discussed preventative counseling with the patient in detail.

## 2020-06-15 NOTE — PATIENT INSTRUCTIONS
Generalized Anxiety Disorder, Adult  Generalized anxiety disorder (JAMEL) is a mental health disorder. People with this condition constantly worry about everyday events. Unlike normal anxiety, worry related to JAMEL is not triggered by a specific event. These worries also do not fade or get better with time. JAMEL interferes with life functions, including relationships, work, and school.  JAMEL can vary from mild to severe. People with severe JAMEL can have intense waves of anxiety with physical symptoms (panic attacks).  What are the causes?  The exact cause of JAMEL is not known.  What increases the risk?  This condition is more likely to develop in:  · Women.  · People who have a family history of anxiety disorders.  · People who are very shy.  · People who experience very stressful life events, such as the death of a loved one.  · People who have a very stressful family environment.  What are the signs or symptoms?  People with JAMEL often worry excessively about many things in their lives, such as their health and family. They may also be overly concerned about:  · Doing well at work.  · Being on time.  · Natural disasters.  · Friendships.  Physical symptoms of JAMEL include:  · Fatigue.  · Muscle tension or having muscle twitches.  · Trembling or feeling shaky.  · Being easily startled.  · Feeling like your heart is pounding or racing.  · Feeling out of breath or like you cannot take a deep breath.  · Having trouble falling asleep or staying asleep.  · Sweating.  · Nausea, diarrhea, or irritable bowel syndrome (IBS).  · Headaches.  · Trouble concentrating or remembering facts.  · Restlessness.  · Irritability.  How is this diagnosed?  Your health care provider can diagnose JAMEL based on your symptoms and medical history. You will also have a physical exam. The health care provider will ask specific questions about your symptoms, including how severe they are, when they started, and if they come and go. Your health care  provider may ask you about your use of alcohol or drugs, including prescription medicines. Your health care provider may refer you to a mental health specialist for further evaluation.  Your health care provider will do a thorough examination and may perform additional tests to rule out other possible causes of your symptoms.  To be diagnosed with JAMEL, a person must have anxiety that:  · Is out of his or her control.  · Affects several different aspects of his or her life, such as work and relationships.  · Causes distress that makes him or her unable to take part in normal activities.  · Includes at least three physical symptoms of JAMEL, such as restlessness, fatigue, trouble concentrating, irritability, muscle tension, or sleep problems.  Before your health care provider can confirm a diagnosis of JAMEL, these symptoms must be present more days than they are not, and they must last for six months or longer.  How is this treated?  The following therapies are usually used to treat JAMEL:  · Medicine. Antidepressant medicine is usually prescribed for long-term daily control. Antianxiety medicines may be added in severe cases, especially when panic attacks occur.  · Talk therapy (psychotherapy). Certain types of talk therapy can be helpful in treating JAMEL by providing support, education, and guidance. Options include:  ? Cognitive behavioral therapy (CBT). People learn coping skills and techniques to ease their anxiety. They learn to identify unrealistic or negative thoughts and behaviors and to replace them with positive ones.  ? Acceptance and commitment therapy (ACT). This treatment teaches people how to be mindful as a way to cope with unwanted thoughts and feelings.  ? Biofeedback. This process trains you to manage your body's response (physiological response) through breathing techniques and relaxation methods. You will work with a therapist while machines are used to monitor your physical symptoms.  · Stress  management techniques. These include yoga, meditation, and exercise.  A mental health specialist can help determine which treatment is best for you. Some people see improvement with one type of therapy. However, other people require a combination of therapies.  Follow these instructions at home:  · Take over-the-counter and prescription medicines only as told by your health care provider.  · Try to maintain a normal routine.  · Try to anticipate stressful situations and allow extra time to manage them.  · Practice any stress management or self-calming techniques as taught by your health care provider.  · Do not punish yourself for setbacks or for not making progress.  · Try to recognize your accomplishments, even if they are small.  · Keep all follow-up visits as told by your health care provider. This is important.  Contact a health care provider if:  · Your symptoms do not get better.  · Your symptoms get worse.  · You have signs of depression, such as:  ? A persistently sad, cranky, or irritable mood.  ? Loss of enjoyment in activities that used to bring you dilcia.  ? Change in weight or eating.  ? Changes in sleeping habits.  ? Avoiding friends or family members.  ? Loss of energy for normal tasks.  ? Feelings of guilt or worthlessness.  Get help right away if:  · You have serious thoughts about hurting yourself or others.  If you ever feel like you may hurt yourself or others, or have thoughts about taking your own life, get help right away. You can go to your nearest emergency department or call:  · Your local emergency services (911 in the [place]  · A suicide crisis helpline, such as the National Suicide Prevention Lifeline at 1-213.443.3490. This is open 24 hours a day.  Summary  · Generalized anxiety disorder (JAMEL) is a mental health disorder that involves worry that is not triggered by a specific event.  · People with JAMEL often worry excessively about many things in their lives, such as their health and  family.  · JAMEL may cause physical symptoms such as restlessness, trouble concentrating, sleep problems, frequent sweating, nausea, diarrhea, headaches, and trembling or muscle twitching.  · A mental health specialist can help determine which treatment is best for you. Some people see improvement with one type of therapy. However, other people require a combination of therapies.  This information is not intended to replace advice given to you by your health care provider. Make sure you discuss any questions you have with your health care provider.  Document Released: 04/14/2014 Document Revised: 11/30/2018 Document Reviewed: 11/07/2017  Elsevier Patient Education © 2020 [employer]

## 2020-09-16 ENCOUNTER — OFFICE VISIT (OUTPATIENT)
Dept: FAMILY MEDICINE CLINIC | Facility: CLINIC | Age: 55
End: 2020-09-16

## 2020-09-16 VITALS
RESPIRATION RATE: 16 BRPM | BODY MASS INDEX: 24.29 KG/M2 | SYSTOLIC BLOOD PRESSURE: 163 MMHG | TEMPERATURE: 97.1 F | HEIGHT: 69 IN | DIASTOLIC BLOOD PRESSURE: 90 MMHG | OXYGEN SATURATION: 97 % | HEART RATE: 77 BPM | WEIGHT: 164 LBS

## 2020-09-16 DIAGNOSIS — J30.1 SEASONAL ALLERGIC RHINITIS DUE TO POLLEN: ICD-10-CM

## 2020-09-16 DIAGNOSIS — I15.9 SECONDARY HYPERTENSION: ICD-10-CM

## 2020-09-16 DIAGNOSIS — F41.1 GAD (GENERALIZED ANXIETY DISORDER): ICD-10-CM

## 2020-09-16 PROCEDURE — 99214 OFFICE O/P EST MOD 30 MIN: CPT | Performed by: FAMILY MEDICINE

## 2020-09-16 RX ORDER — ALPRAZOLAM 1 MG/1
TABLET ORAL
Qty: 75 TABLET | Refills: 2 | Status: SHIPPED | OUTPATIENT
Start: 2020-09-16 | End: 2020-12-11 | Stop reason: SDUPTHER

## 2020-09-16 RX ORDER — IRBESARTAN 150 MG/1
150 TABLET ORAL NIGHTLY
Qty: 90 TABLET | Refills: 1 | Status: SHIPPED | OUTPATIENT
Start: 2020-09-16 | End: 2021-05-06

## 2020-09-16 NOTE — PATIENT INSTRUCTIONS
Generalized Anxiety Disorder, Adult  Generalized anxiety disorder (JAMEL) is a mental health disorder. People with this condition constantly worry about everyday events. Unlike normal anxiety, worry related to JAMEL is not triggered by a specific event. These worries also do not fade or get better with time. JAMEL interferes with life functions, including relationships, work, and school.  JAMEL can vary from mild to severe. People with severe JAMEL can have intense waves of anxiety with physical symptoms (panic attacks).  What are the causes?  The exact cause of JAMEL is not known.  What increases the risk?  This condition is more likely to develop in:  · Women.  · People who have a family history of anxiety disorders.  · People who are very shy.  · People who experience very stressful life events, such as the death of a loved one.  · People who have a very stressful family environment.  What are the signs or symptoms?  People with JAMEL often worry excessively about many things in their lives, such as their health and family. They may also be overly concerned about:  · Doing well at work.  · Being on time.  · Natural disasters.  · Friendships.  Physical symptoms of JAMEL include:  · Fatigue.  · Muscle tension or having muscle twitches.  · Trembling or feeling shaky.  · Being easily startled.  · Feeling like your heart is pounding or racing.  · Feeling out of breath or like you cannot take a deep breath.  · Having trouble falling asleep or staying asleep.  · Sweating.  · Nausea, diarrhea, or irritable bowel syndrome (IBS).  · Headaches.  · Trouble concentrating or remembering facts.  · Restlessness.  · Irritability.  How is this diagnosed?  Your health care provider can diagnose JAMEL based on your symptoms and medical history. You will also have a physical exam. The health care provider will ask specific questions about your symptoms, including how severe they are, when they started, and if they come and go. Your health care  provider may ask you about your use of alcohol or drugs, including prescription medicines. Your health care provider may refer you to a mental health specialist for further evaluation.  Your health care provider will do a thorough examination and may perform additional tests to rule out other possible causes of your symptoms.  To be diagnosed with JAMEL, a person must have anxiety that:  · Is out of his or her control.  · Affects several different aspects of his or her life, such as work and relationships.  · Causes distress that makes him or her unable to take part in normal activities.  · Includes at least three physical symptoms of JAMEL, such as restlessness, fatigue, trouble concentrating, irritability, muscle tension, or sleep problems.  Before your health care provider can confirm a diagnosis of JAMEL, these symptoms must be present more days than they are not, and they must last for six months or longer.  How is this treated?  The following therapies are usually used to treat JAMEL:  · Medicine. Antidepressant medicine is usually prescribed for long-term daily control. Antianxiety medicines may be added in severe cases, especially when panic attacks occur.  · Talk therapy (psychotherapy). Certain types of talk therapy can be helpful in treating JAMEL by providing support, education, and guidance. Options include:  ? Cognitive behavioral therapy (CBT). People learn coping skills and techniques to ease their anxiety. They learn to identify unrealistic or negative thoughts and behaviors and to replace them with positive ones.  ? Acceptance and commitment therapy (ACT). This treatment teaches people how to be mindful as a way to cope with unwanted thoughts and feelings.  ? Biofeedback. This process trains you to manage your body's response (physiological response) through breathing techniques and relaxation methods. You will work with a therapist while machines are used to monitor your physical symptoms.  · Stress  management techniques. These include yoga, meditation, and exercise.  A mental health specialist can help determine which treatment is best for you. Some people see improvement with one type of therapy. However, other people require a combination of therapies.  Follow these instructions at home:  · Take over-the-counter and prescription medicines only as told by your health care provider.  · Try to maintain a normal routine.  · Try to anticipate stressful situations and allow extra time to manage them.  · Practice any stress management or self-calming techniques as taught by your health care provider.  · Do not punish yourself for setbacks or for not making progress.  · Try to recognize your accomplishments, even if they are small.  · Keep all follow-up visits as told by your health care provider. This is important.  Contact a health care provider if:  · Your symptoms do not get better.  · Your symptoms get worse.  · You have signs of depression, such as:  ? A persistently sad, cranky, or irritable mood.  ? Loss of enjoyment in activities that used to bring you dilcia.  ? Change in weight or eating.  ? Changes in sleeping habits.  ? Avoiding friends or family members.  ? Loss of energy for normal tasks.  ? Feelings of guilt or worthlessness.  Get help right away if:  · You have serious thoughts about hurting yourself or others.  If you ever feel like you may hurt yourself or others, or have thoughts about taking your own life, get help right away. You can go to your nearest emergency department or call:  · Your local emergency services (911 in the [place]  · A suicide crisis helpline, such as the National Suicide Prevention Lifeline at 1-451.727.9724. This is open 24 hours a day.  Summary  · Generalized anxiety disorder (JAMEL) is a mental health disorder that involves worry that is not triggered by a specific event.  · People with JAMEL often worry excessively about many things in their lives, such as their health and  family.  · JAMEL may cause physical symptoms such as restlessness, trouble concentrating, sleep problems, frequent sweating, nausea, diarrhea, headaches, and trembling or muscle twitching.  · A mental health specialist can help determine which treatment is best for you. Some people see improvement with one type of therapy. However, other people require a combination of therapies.  This information is not intended to replace advice given to you by your health care provider. Make sure you discuss any questions you have with your health care provider.  Document Released: 04/14/2014 Document Revised: 11/30/2018 Document Reviewed: 11/07/2017  Elsevier Patient Education © 2020 [employer]

## 2020-09-16 NOTE — PROGRESS NOTES
Subjective   Chief Complaint:   Chief Complaint   Patient presents with   • Anxiety         History of Present Illness The patient has read and signed the [facility] Controlled Substance Contract.  I will continue to see patient for regular follow up appointments.  They are well controlled on their medication.  JADEN has been reviewed by me and is updated every 3 months. The patient is aware of the potential for addiction and dependence.  he comes in today to get his medications refilled on refill his Xanax and he gets  75  tablets with 2 refills.  Some get a refill his Avapro 150 mg 1 a day #90 with a refill.  Call me his blood pressure in the next 2 to 4 weeks and tell me how his blood pressure is doing on Avapro 150 mg 1 a day.  And then I will see him again in 90 days when he needs his refill of his Xanax so I just took his blood pressure I got about 150-160/90 and he took Avapro 150 mg today and is on Xanax as stated so I will check him back in 3 months and I want him to call me in 2 or 3 weeks just tell me what is good for his blood pressure at home CVs better when he is more relaxed he is just taking a half of an Avapro 150 mg arrival daily.          Past Medical History:   Diagnosis Date   • JAMEL (generalized anxiety disorder)    • History of colonoscopy     never   • Hypertension    • Screening for prostate cancer     never        Raphael Garcia 55 y.o. male who presents today for Medical Management of Anxiety and to get  Refills of Xanax.    ICD-10-CM ICD-9-CM   1. Secondary hypertension  I15.9 405.99   2. JAMEL (generalized anxiety disorder)  F41.1 300.02   3. Seasonal allergic rhinitis due to pollen  J30.1 477.0        he has a problem list of   Patient Active Problem List   Diagnosis   • Hypertension   • JAMEL (generalized anxiety disorder)   • Allergic rhinitis due to pollen   .    he has been compliant with   Current Outpatient Medications:   •  ALPRAZolam (Xanax) 1 MG tablet, Take one tablet  "BID and may take an additional one half tablet at night prn, Disp: 75 tablet, Rfl: 2  •  cetirizine (zyrTEC) 10 MG tablet, TAKE ONE TABLET BY MOUTH DAILY, Disp: 30 tablet, Rfl: 4  •  irbesartan (Avapro) 150 MG tablet, Take 1 tablet by mouth Every Night for 90 days., Disp: 90 tablet, Rfl: 1.  he denies medication side effects.        /90   Pulse 77   Temp 97.1 °F (36.2 °C)   Resp 16   Ht 175.3 cm (69\")   Wt 74.4 kg (164 lb)   SpO2 97%   BMI 24.22 kg/m²     Results for orders placed or performed in visit on 02/14/19   POC Influenza A / B    Specimen: Swab   Result Value Ref Range    Rapid Influenza A Ag Negative Negative    Rapid Influenza B Ag Negative Negative    Internal Control Passed Passed    Lot Number [id number]     Expiration Date 9/21/2021        The following portions of the patient's history were reviewed and updated as appropriate: allergies, current medications, past family history, past medical history, past social history, past surgical history and problem list.      he has a history of   Patient Active Problem List   Diagnosis   • Hypertension   • JAMEL (generalized anxiety disorder)   • Allergic rhinitis due to pollen       Review of Systems   Constitutional: Negative for fatigue and fever.   HENT: Negative for congestion and nosebleeds.    Eyes: Negative for visual disturbance.   Respiratory: Negative for shortness of breath.    Cardiovascular: Negative for chest pain.   Gastrointestinal: Negative for abdominal distention.   Genitourinary: Negative for difficulty urinating.   Musculoskeletal: Negative for back pain.   Neurological: Negative for dizziness.   Psychiatric/Behavioral: Negative for agitation and behavioral problems.   All other systems reviewed and are negative.      Objective   Physical Exam  Vitals signs and nursing note reviewed.   Constitutional:       Appearance: He is well-developed.   HENT:      Head: Atraumatic.   Eyes:      Pupils: Pupils are equal, round, and reactive " to light.   Neck:      Musculoskeletal: Normal range of motion and neck supple.      Thyroid: No thyromegaly.   Cardiovascular:      Rate and Rhythm: Normal rate and regular rhythm.   Pulmonary:      Effort: Pulmonary effort is normal. No respiratory distress.      Breath sounds: Normal breath sounds.   Abdominal:      Palpations: Abdomen is soft.      Tenderness: There is no abdominal tenderness.   Musculoskeletal: Normal range of motion.         General: No swelling.   Skin:     General: Skin is warm and dry.      Findings: No rash.   Neurological:      General: No focal deficit present.      Mental Status: He is alert and oriented to person, place, and time.   Psychiatric:         Mood and Affect: Mood normal.         Behavior: Behavior normal.         Assessment/Plan   Raphael was seen today for anxiety.    Diagnoses and all orders for this visit:    Secondary hypertension  -     ALPRAZolam (Xanax) 1 MG tablet; Take one tablet BID and may take an additional one half tablet at night prn    JAMEL (generalized anxiety disorder)  -     ALPRAZolam (Xanax) 1 MG tablet; Take one tablet BID and may take an additional one half tablet at night prn    Seasonal allergic rhinitis due to pollen  -     ALPRAZolam (Xanax) 1 MG tablet; Take one tablet BID and may take an additional one half tablet at night prn    Other orders  -     irbesartan (Avapro) 150 MG tablet; Take 1 tablet by mouth Every Night for 90 days.      Labs results discussed in detail with the patient, if no recent labs were done, order placed today.  Plan to update vaccines if needed today.  I  reviewed health maintenance with the patient as part of my preventative care plan. I discussed preventative counseling with the patient in detail.

## 2020-12-11 ENCOUNTER — OFFICE VISIT (OUTPATIENT)
Dept: FAMILY MEDICINE CLINIC | Facility: CLINIC | Age: 55
End: 2020-12-11

## 2020-12-11 DIAGNOSIS — F41.1 GAD (GENERALIZED ANXIETY DISORDER): Primary | ICD-10-CM

## 2020-12-11 DIAGNOSIS — J30.1 SEASONAL ALLERGIC RHINITIS DUE TO POLLEN: ICD-10-CM

## 2020-12-11 DIAGNOSIS — F41.9 ANXIETY: Primary | ICD-10-CM

## 2020-12-11 DIAGNOSIS — F41.1 GAD (GENERALIZED ANXIETY DISORDER): ICD-10-CM

## 2020-12-11 DIAGNOSIS — I15.9 SECONDARY HYPERTENSION: ICD-10-CM

## 2020-12-11 PROCEDURE — 99441 PR PHYS/QHP TELEPHONE EVALUATION 5-10 MIN: CPT | Performed by: FAMILY MEDICINE

## 2020-12-11 RX ORDER — ALPRAZOLAM 1 MG/1
TABLET ORAL
Qty: 75 TABLET | Refills: 2 | Status: SHIPPED | OUTPATIENT
Start: 2020-12-11 | End: 2021-03-15 | Stop reason: SDUPTHER

## 2020-12-11 NOTE — PROGRESS NOTES
You have chosen to receive care through a telephone visit. Do you consent to use a telephone visit for your medical care today? Yes     He chose to use form of communication is the phone to refill the Xanax he is on Xanax 1 mg tablet twice daily as needed anxiety and he can take a half a tablet as needed additional anxiety can actually take 2-1/2 tablets/day so total per month he gets Xanax 1 mg  75 tablets/month.  He does well on this Xanax for anxiety and again he could take a max of 2-1/2 tablets/day.  Xanax was refilled and he takes this very carefully and he calls me every 90 days for refill.  So again Xanax was refilled as stated above and he does good on this dose and is going to call me in 3 months or sooner if any problems      Final diagnoses:   Secondary hypertension   JAMEL (generalized anxiety disorder)   Seasonal allergic rhinitis due to pollen   Anxiety       [id number]  9 minutes

## 2021-03-15 ENCOUNTER — OFFICE VISIT (OUTPATIENT)
Dept: FAMILY MEDICINE CLINIC | Facility: CLINIC | Age: 56
End: 2021-03-15

## 2021-03-15 DIAGNOSIS — I15.9 SECONDARY HYPERTENSION: ICD-10-CM

## 2021-03-15 DIAGNOSIS — F41.1 GAD (GENERALIZED ANXIETY DISORDER): ICD-10-CM

## 2021-03-15 DIAGNOSIS — J30.1 SEASONAL ALLERGIC RHINITIS DUE TO POLLEN: ICD-10-CM

## 2021-03-15 PROCEDURE — 99441 PR PHYS/QHP TELEPHONE EVALUATION 5-10 MIN: CPT | Performed by: FAMILY MEDICINE

## 2021-03-15 NOTE — PROGRESS NOTES
You have chosen to receive care through a telephone visit. Do you consent to use a telephone visit for your medical care today? Yes    This patient is here for a Xanax refill. He does well on his current dose. I am refilling Xanax 1mg BID with an extra half tablet PRN anxiety    Final diagnoses:   Secondary hypertension   JAMEL (generalized anxiety disorder)   Seasonal allergic rhinitis due to pollen     [id number]  6 minutes

## 2021-03-16 RX ORDER — ALPRAZOLAM 1 MG/1
TABLET ORAL
Qty: 75 TABLET | Refills: 2 | Status: SHIPPED | OUTPATIENT
Start: 2021-03-16 | End: 2021-06-14 | Stop reason: SDUPTHER

## 2021-03-30 ENCOUNTER — BULK ORDERING (OUTPATIENT)
Dept: CASE MANAGEMENT | Facility: OTHER | Age: 56
End: 2021-03-30

## 2021-03-30 DIAGNOSIS — Z23 IMMUNIZATION DUE: ICD-10-CM

## 2021-03-31 ENCOUNTER — TELEPHONE (OUTPATIENT)
Dept: FAMILY MEDICINE CLINIC | Facility: CLINIC | Age: 56
End: 2021-03-31

## 2021-03-31 NOTE — TELEPHONE ENCOUNTER
Caller: Alicja Garcia    Relationship to patient: Emergency Contact    Best call back number: [phone] (H)    Type of visit: HOSPITAL FOLLOW UP    Requested date: 3/31/21       Additional notes:PATIENT WAS ADMITTED TO [facility] ON 3/15/21 FOR AN ACUTE  STROKE, AND DISCHARGED ON 3/17/21. PATIENTS WIFE CALLED IN TO LET DR. JOSE KNOW THAT HE WAS IN THE HOSPITAL, AND THAT HE WAS PUT ON NEW MEDICATION IN THE HOSPITAL. ADVISED PATIENTS WIFE THAT PATIENT WOULD NEED TO BE SEEN BY DR JOSE, BUT IT IS OUT OF MY 7 DAY WINDOW TO SCHEDULE. PLEASE CALL PATIENT OR PATIENTS WIFE TO SCHEDULE THE APPOINTMENT.  PATIENTS WIFE WOULD LIKE PATIENT TO BE SCHEDULED FOR Friday MORNING, AND SHE WOULD LIKE TO COME INTO THE APPOINTMENT WITH PATIENT TO HELP THE PATIENT UNDERSTAND EVERYTHING THAT THE DOCTOR IS TELLING HIM.  THANK YOU.

## 2021-04-02 ENCOUNTER — OFFICE VISIT (OUTPATIENT)
Dept: FAMILY MEDICINE CLINIC | Facility: CLINIC | Age: 56
End: 2021-04-02

## 2021-04-02 VITALS
BODY MASS INDEX: 25.48 KG/M2 | HEART RATE: 95 BPM | RESPIRATION RATE: 16 BRPM | TEMPERATURE: 97.8 F | SYSTOLIC BLOOD PRESSURE: 141 MMHG | WEIGHT: 172 LBS | DIASTOLIC BLOOD PRESSURE: 91 MMHG | HEIGHT: 69 IN | OXYGEN SATURATION: 95 %

## 2021-04-02 DIAGNOSIS — I63.9 CEREBROVASCULAR ACCIDENT (CVA), UNSPECIFIED MECHANISM (HCC): Primary | ICD-10-CM

## 2021-04-02 PROCEDURE — 99214 OFFICE O/P EST MOD 30 MIN: CPT | Performed by: FAMILY MEDICINE

## 2021-04-02 RX ORDER — ATORVASTATIN CALCIUM 80 MG/1
TABLET, FILM COATED ORAL
COMMUNITY
Start: 2021-03-17 | End: 2021-07-01 | Stop reason: SDUPTHER

## 2021-04-02 RX ORDER — ASPIRIN 81 MG
TABLET,CHEWABLE ORAL
COMMUNITY
Start: 2021-03-17 | End: 2022-04-11

## 2021-04-02 RX ORDER — CLOPIDOGREL BISULFATE 75 MG/1
TABLET ORAL
COMMUNITY
Start: 2021-03-17 | End: 2021-04-04 | Stop reason: SDUPTHER

## 2021-04-02 NOTE — PROGRESS NOTES
"Subjective   Chief Complaint:   Chief Complaint   Patient presents with   • F/up from Stroke   • Hypertension         History of Present Illness This patient comes in for a hospital follow-up after an admission for a stroke. /91, 142/80, 132/82 by me in the office today. He continues to smoke 1/2 ppd. He is now anticoagulated on Plavix 75mg one daily -- I am going to fill this for him today #30 with 2 refills. He has started a nicotine transdermal patch to attempt smoking cessation. On exam, he alert and answered all my questions. I do not appreciate any facial droop or focal weakness. We discussed the plan to continue monitoring his BP. He should continue smoking cessation. I will see him in a couple weeks for recheck.    Per admitting MD at [facility],   \"Hospital Course: 55-year-old male with past medical history hypertension, tobacco abuse who presented to [facility]- secondary to slurred speech. Patient states his symptoms began on Friday when he had sudden onset of change in his speech as well as bilateral forearm numbness. His symptoms continued over the weekend. He went to work this morning and his coworkers noted his speech was different. He told his wife who brought him to ED for further evaluation. He also states he is supposed to be on an antihypertensive but he has not taken in the past month. He states his medicine \"makes him feel bad.\" He denies headache, change in vision, focal weakness, chest pain, shortness of air, syncope. He was found to be hypertensive with blood pressure 214/133 on arrival to ED. He states his bilateral hand numbness has resolved but he continues to have mildly slurred speech.  Acute basal ganglia stroke  Patient is very non-compliant with meds, alcohol abuse, nicotine dependent. CVA highly likely to happen again if he does not start caring more for his health  ECHO transthoracic and JES done while here  Cards saw patient daily, neuro as well, " "placed on statin asa continue his ACEI, nicotine patch\"      MEDICATIONS AT DISCHARGE:  START taking these medications:    aspirin 81 MG chewable tablet Chew 1 tablet by mouth daily for 30 days.  Qty: 30 tablet, Refills: 0     atorvastatin (LIPITOR) 80 MG tablet Take 1 tablet by mouth nightly for 30 days.  Qty: 30 tablet, Refills: 0     clopidogrel (PLAVIX) 75 MG tablet Take 1 tablet by mouth daily for 30 days.  Qty: 30 tablet, Refills: 0     nicotine (NICODERM CQ) 14 MG/24HR Place 1 patch onto the skin daily for 30 days.  Qty: 30 patch, Refills: 0       CONTINUE these medications which have NOT CHANGED:    acetaminophen-codeine (TYLENOL #4) 300-60 MG Take 1 tablet by mouth every 4 (four) hours as needed for Pain  Qty: 15 tablet, Refills: 0     ALPRAZolam (XANAX) 2 MG tablet Take 2 mg by mouth nightly as needed for Sleep     ibuprofen (ADVIL,MOTRIN) 800 MG tablet Take 1 tablet by mouth every 6 (six) hours as needed for Pain  Qty: 30 tablet, Refills: 0     irbesartan (AVAPRO) 300 MG tablet Take 300 mg by mouth nightly       Past Medical History:   Diagnosis Date   • JAMEL (generalized anxiety disorder)    • History of colonoscopy     never   • Hypertension    • Screening for prostate cancer     never        Raphael Garcia 55 y.o. male who presents today for Medical Management of the below listed issues and medication refills.    ICD-10-CM ICD-9-CM   1. Cerebrovascular accident (CVA), unspecified mechanism (CMS/[facility])  I63.9 434.91        he has a problem list of   Patient Active Problem List   Diagnosis   • Hypertension   • JAMEL (generalized anxiety disorder)   • Allergic rhinitis due to pollen   .    he has been compliant with   Current Outpatient Medications:   •  ALPRAZolam (Xanax) 1 MG tablet, Take one tablet BID and may take an additional one half tablet at night prn, Disp: 75 tablet, Rfl: 2  •  Aspirin Low Dose 81 MG chewable tablet, , Disp: , Rfl:   •  atorvastatin (LIPITOR) 80 MG tablet, , Disp: , Rfl:   • " " cetirizine (zyrTEC) 10 MG tablet, TAKE ONE TABLET BY MOUTH DAILY, Disp: 30 tablet, Rfl: 4  •  clopidogrel (PLAVIX) 75 MG tablet, Take 1 tablet by mouth Daily., Disp: 30 tablet, Rfl: 2  •  Nicotine Step 2 14 MG/24HR patch, , Disp: , Rfl:   •  irbesartan (Avapro) 150 MG tablet, Take 1 tablet by mouth Every Night for 90 days., Disp: 90 tablet, Rfl: 1.  he denies medication side effects.        /91   Pulse 95   Temp 97.8 °F (36.6 °C)   Resp 16   Ht 175.3 cm (69\")   Wt 78 kg (172 lb)   SpO2 95%   BMI 25.40 kg/m²     Results for orders placed or performed during the hospital encounter of 01/25/21   COVID-19,[facility] ROUTINE, NP/OP SWAB IN TRANSPORT MEDIA OR ESWAB 72 HR TAT - Swab, Nasopharynx    Specimen: Nasopharynx; Swab   Result Value Ref Range    SARS-CoV-2, DIANA Detected (A) Not Detected   POCT Influenza A/B    Specimen: Swab   Result Value Ref Range    Rapid Influenza A Ag Negative Negative    Rapid Influenza B Ag Negative Negative    Internal Control Passed Passed    Lot Number [id number]     Expiration Date 123,121    POCT Rapid Strep A    Specimen: Swab   Result Value Ref Range    Rapid Strep A Screen Negative Negative, VALID, INVALID, Not Performed    Internal Control Passed Passed    Lot Number [id number]     Expiration Date 4.30.21        The following portions of the patient's history were reviewed and updated as appropriate: allergies, current medications, past family history, past medical history, past social history, past surgical history and problem list.      he has a history of   Patient Active Problem List   Diagnosis   • Hypertension   • JAMEL (generalized anxiety disorder)   • Allergic rhinitis due to pollen       Review of Systems   Constitutional: Negative for activity change, appetite change, chills, diaphoresis, fatigue, fever and unexpected weight change.   Eyes: Negative for visual disturbance.   Respiratory: Negative for chest tightness and shortness of breath.    Cardiovascular: " Negative for chest pain and palpitations.   Skin: Negative for color change.   Neurological: Negative for dizziness, syncope, facial asymmetry, speech difficulty, weakness, numbness and headaches.   Psychiatric/Behavioral: Negative for confusion and decreased concentration.   All other systems reviewed and are negative.      Objective   Physical Exam  Vitals and nursing note reviewed.   Constitutional:       General: He is not in acute distress.     Appearance: He is well-developed. He is not ill-appearing, toxic-appearing or diaphoretic.   HENT:      Head: Atraumatic.   Eyes:      Pupils: Pupils are equal, round, and reactive to light.   Neck:      Thyroid: No thyromegaly.   Cardiovascular:      Rate and Rhythm: Normal rate and regular rhythm.   Pulmonary:      Effort: Pulmonary effort is normal.      Breath sounds: Normal breath sounds.   Abdominal:      Palpations: Abdomen is soft.   Musculoskeletal:         General: No signs of injury. Normal range of motion.      Cervical back: Normal range of motion and neck supple.   Skin:     General: Skin is warm and dry.      Coloration: Skin is not pale.      Findings: No erythema.   Neurological:      Mental Status: He is alert and oriented to person, place, and time.      Motor: No weakness.      Gait: Gait normal.      Comments: I do not appreciate any facial droop or focal weakness.   Psychiatric:         Mood and Affect: Mood normal.         Behavior: Behavior normal.         Thought Content: Thought content normal.         Judgment: Judgment normal.         Assessment/Plan   Diagnoses and all orders for this visit:    1. Cerebrovascular accident (CVA), unspecified mechanism (CMS/HCC) (Primary)  -     clopidogrel (PLAVIX) 75 MG tablet; Take 1 tablet by mouth Daily.  Dispense: 30 tablet; Refill: 2      Labs results discussed in detail with the patient, if no recent labs were done, order placed today.  Plan to update vaccines if needed today.  I  reviewed health  maintenance with the patient as part of my preventative care plan. I discussed preventative counseling with the patient in detail.

## 2021-04-04 ENCOUNTER — TELEPHONE (OUTPATIENT)
Dept: FAMILY MEDICINE CLINIC | Facility: CLINIC | Age: 56
End: 2021-04-04

## 2021-04-04 RX ORDER — CLOPIDOGREL BISULFATE 75 MG/1
75 TABLET ORAL DAILY
Qty: 30 TABLET | Refills: 2 | Status: SHIPPED | OUTPATIENT
Start: 2021-04-04 | End: 2021-07-01 | Stop reason: SDUPTHER

## 2021-04-04 NOTE — TELEPHONE ENCOUNTER
Spoke with patient about smoking cessation.     He is requesting a refill of STEP 3 7mg to [facility]. I have called this in.

## 2021-05-06 RX ORDER — IRBESARTAN 150 MG/1
150 TABLET ORAL NIGHTLY
Qty: 90 TABLET | Refills: 1 | Status: SHIPPED | OUTPATIENT
Start: 2021-05-06 | End: 2021-07-01 | Stop reason: SDUPTHER

## 2021-05-06 NOTE — TELEPHONE ENCOUNTER
----- Message from Raphael Garcia sent at 5/6/2021  9:32 AM EDT -----  Regarding: Prescription Question  Contact: [phone]  Hello, On my last visit post CVA, in which my doctor increased my irbesartan dose to 300mg QD per neurologist order secondary post CVA r/t HTN. The Rx I have now is 150mg, I have been taking 2 (300mg). I need these refilled. On my Rx bottle is says no refills. I need these filled, I only have 6 days left. Thank you

## 2021-06-10 ENCOUNTER — TELEPHONE (OUTPATIENT)
Dept: FAMILY MEDICINE CLINIC | Facility: CLINIC | Age: 56
End: 2021-06-10

## 2021-06-10 NOTE — TELEPHONE ENCOUNTER
Patient is requesting a refill of Xanax.  He is due an appt., but wants to know if he can get one more refill until he makes an appt.  Please advise as patient would like for me to call him back and let him know if it will be refilled

## 2021-06-14 ENCOUNTER — TELEPHONE (OUTPATIENT)
Dept: FAMILY MEDICINE CLINIC | Facility: CLINIC | Age: 56
End: 2021-06-14

## 2021-06-14 DIAGNOSIS — J30.1 SEASONAL ALLERGIC RHINITIS DUE TO POLLEN: ICD-10-CM

## 2021-06-14 DIAGNOSIS — F41.1 GAD (GENERALIZED ANXIETY DISORDER): ICD-10-CM

## 2021-06-14 DIAGNOSIS — I15.9 SECONDARY HYPERTENSION: ICD-10-CM

## 2021-06-14 RX ORDER — ALPRAZOLAM 1 MG/1
TABLET ORAL
Qty: 75 TABLET | Refills: 2 | OUTPATIENT
Start: 2021-06-14

## 2021-06-14 RX ORDER — ALPRAZOLAM 1 MG/1
TABLET ORAL
Qty: 75 TABLET | Refills: 0 | Status: SHIPPED | OUTPATIENT
Start: 2021-06-14 | End: 2021-07-01 | Stop reason: SDUPTHER

## 2021-06-14 NOTE — TELEPHONE ENCOUNTER
Caller: Raphael Garcia    Relationship: Self    Best call back number: [phone]    Medication needed:   Requested Prescriptions     Pending Prescriptions Disp Refills   • ALPRAZolam (Xanax) 1 MG tablet 75 tablet 2     Sig: Take one tablet BID and may take an additional one half tablet at night prn       When do you need the refill by: 06/14/21    What additional details did the patient provide when requesting the medication: PATIENT HAS UPCOMING APPOINTMENT ON 07/01/21 - FIRST AVAILABLE. PATIENT NEEDS MEDICATION TO LAST UNTIL THIS APPOINTMENT    Does the patient have less than a 3 day supply:  [x] Yes  [] No    What is the patient's preferred pharmacy: [address] [place] [address] AT [facility] [phone] [facility] [phone]

## 2021-06-14 NOTE — TELEPHONE ENCOUNTER
Creatinine of 2.0 on admission and was 1.9 on discharge 9/11/2020.  Her baseline is around 1.8.    -Renally dose medication for CrCl of <30  -Avoid Nephrotoxic medications if able  -Monitor urine output  -Follow closely     Pt has only two days left of his xanax. He does have an appointment scheduled for 7/1/21. I looked for an appointment soon and we do not have anything this week. Can we give him enough until appointment. Please advise

## 2021-07-01 ENCOUNTER — OFFICE VISIT (OUTPATIENT)
Dept: FAMILY MEDICINE CLINIC | Facility: CLINIC | Age: 56
End: 2021-07-01

## 2021-07-01 VITALS
WEIGHT: 172 LBS | RESPIRATION RATE: 16 BRPM | OXYGEN SATURATION: 94 % | HEART RATE: 80 BPM | DIASTOLIC BLOOD PRESSURE: 96 MMHG | HEIGHT: 69 IN | BODY MASS INDEX: 25.48 KG/M2 | SYSTOLIC BLOOD PRESSURE: 159 MMHG | TEMPERATURE: 98.3 F

## 2021-07-01 DIAGNOSIS — Z72.0 TOBACCO ABUSE: ICD-10-CM

## 2021-07-01 DIAGNOSIS — J30.1 SEASONAL ALLERGIC RHINITIS DUE TO POLLEN: ICD-10-CM

## 2021-07-01 DIAGNOSIS — I10 ESSENTIAL HYPERTENSION: Primary | ICD-10-CM

## 2021-07-01 DIAGNOSIS — I63.9 CEREBROVASCULAR ACCIDENT (CVA), UNSPECIFIED MECHANISM (HCC): ICD-10-CM

## 2021-07-01 DIAGNOSIS — E78.49 OTHER HYPERLIPIDEMIA: ICD-10-CM

## 2021-07-01 DIAGNOSIS — F41.1 GAD (GENERALIZED ANXIETY DISORDER): ICD-10-CM

## 2021-07-01 PROCEDURE — 99214 OFFICE O/P EST MOD 30 MIN: CPT | Performed by: FAMILY MEDICINE

## 2021-07-01 RX ORDER — CETIRIZINE HYDROCHLORIDE 10 MG/1
10 TABLET ORAL DAILY
Qty: 30 TABLET | Refills: 4 | Status: CANCELLED | OUTPATIENT
Start: 2021-07-01

## 2021-07-01 RX ORDER — CLOPIDOGREL BISULFATE 75 MG/1
75 TABLET ORAL DAILY
Qty: 90 TABLET | Refills: 0 | Status: SHIPPED | OUTPATIENT
Start: 2021-07-01 | End: 2021-09-29

## 2021-07-01 RX ORDER — IRBESARTAN 300 MG/1
300 TABLET ORAL NIGHTLY
Qty: 90 TABLET | Refills: 0 | Status: SHIPPED | OUTPATIENT
Start: 2021-07-01 | End: 2021-09-29

## 2021-07-01 RX ORDER — ALPRAZOLAM 1 MG/1
TABLET ORAL
Qty: 75 TABLET | Refills: 2 | Status: SHIPPED | OUTPATIENT
Start: 2021-07-01 | End: 2021-10-07 | Stop reason: SDUPTHER

## 2021-07-01 RX ORDER — ATORVASTATIN CALCIUM 40 MG/1
40 TABLET, FILM COATED ORAL NIGHTLY
Qty: 90 TABLET | Refills: 0 | Status: SHIPPED | OUTPATIENT
Start: 2021-07-01 | End: 2021-10-07 | Stop reason: SDUPTHER

## 2021-07-01 NOTE — ASSESSMENT & PLAN NOTE
Blood pressure not to goal.  Will increase irbesartan to 300 mg daily.  Follow-up 1 month after repeat labs.  Patient will take his blood pressure medicine at bedtime dosing.

## 2021-07-01 NOTE — ASSESSMENT & PLAN NOTE
Continue Plavix therapy.  No new symptoms.  Recheck next visit.  Blood pressure control is our next goal.

## 2021-07-01 NOTE — PROGRESS NOTES
"Chief Complaint  Anxiety (3 month follow up, med refill)    Adali Lim presents to [facility] to refill medicines.  Blood pressure is still above goal on current therapy.  He has been taking half of his current statin medication at bedtime due to higher dose because of side effects.  Anxiety stable.  Vicente report reviewed.  He continues to take Plavix for history of CVA back in March 2021.  He is still continuing to struggle with tobacco use.  He has cut back somewhat.  No new cerebrovascular symptoms.  The original stroke caused problems with word finding and possibly some weakness but he has recovered from that.  He stays active.  He has been taking one half blood pressure pills daily recently.          Objective   Vital Signs:   Vitals:    07/01/21 1045   BP: 159/96   Pulse: 80   Resp: 16   Temp: 98.3 °F (36.8 °C)   TempSrc: Skin   SpO2: 94%   Weight: 78 kg (172 lb)   Height: 175.3 cm (69\")        Physical Exam  Vitals reviewed.   Constitutional:       General: He is not in acute distress.  Eyes:      General: Lids are normal.      Conjunctiva/sclera: Conjunctivae normal.   Neck:      Vascular: No carotid bruit.      Trachea: No tracheal deviation.   Cardiovascular:      Rate and Rhythm: Normal rate and regular rhythm.      Heart sounds: Normal heart sounds. No murmur heard.     Pulmonary:      Effort: Pulmonary effort is normal.      Breath sounds: Normal breath sounds.   Skin:     General: Skin is warm and dry.   Neurological:      Mental Status: He is alert. He is not disoriented.   Psychiatric:         Speech: Speech normal.         Behavior: Behavior normal. Behavior is cooperative.          Result Review :                Assessment and Plan    Diagnoses and all orders for this visit:    1. Essential hypertension (Primary)  Assessment & Plan:  Blood pressure not to goal.  Will increase irbesartan to 300 mg daily.  Follow-up 1 month after repeat labs.  Patient " will take his blood pressure medicine at bedtime dosing.    Orders:  -     irbesartan (Avapro) 300 MG tablet; Take 1 tablet by mouth Every Night for 90 days.  Dispense: 90 tablet; Refill: 0  -     Comprehensive Metabolic Panel; Future  -     CBC & Differential; Future  -     TSH; Future    2. Cerebrovascular accident (CVA), unspecified mechanism (CMS/HCC)  Assessment & Plan:  Continue Plavix therapy.  No new symptoms.  Recheck next visit.  Blood pressure control is our next goal.    Orders:  -     clopidogrel (PLAVIX) 75 MG tablet; Take 1 tablet by mouth Daily for 90 days.  Dispense: 90 tablet; Refill: 0  -     atorvastatin (LIPITOR) 40 MG tablet; Take 1 tablet by mouth Every Night for 90 days.  Dispense: 90 tablet; Refill: 0  -     Lipid Panel With / Chol / HDL Ratio; Future    3. JAMEL (generalized anxiety disorder)  Assessment & Plan:  Anxiety stable on current therapy.  Continue same.    Orders:  -     ALPRAZolam (Xanax) 1 MG tablet; Take one tablet BID and may take an additional one half tablet at night prn  Dispense: 75 tablet; Refill: 2    4. Seasonal allergic rhinitis due to pollen  Assessment & Plan:  Continue as needed usage of Zyrtec.    Orders:  -     ALPRAZolam (Xanax) 1 MG tablet; Take one tablet BID and may take an additional one half tablet at night prn  Dispense: 75 tablet; Refill: 2    5. Tobacco abuse    6. Other hyperlipidemia  Assessment & Plan:  Plan to use current statin therapy at bedtime dosing.  Total dose 40 mg.  Patient instructed not to change or modify this dosing regimen.  Dosing timing will be at bedtime.    Orders:  -     atorvastatin (LIPITOR) 40 MG tablet; Take 1 tablet by mouth Every Night for 90 days.  Dispense: 90 tablet; Refill: 0  -     Lipid Panel With / Chol / HDL Ratio; Future  -     CK; Future    Other orders  -     Cancel: cetirizine (zyrTEC) 10 MG tablet; Take 1 tablet by mouth Daily.  Dispense: 30 tablet; Refill: 4      Follow Up   Return in about 1 month (around  8/1/2021) for recheck/refill medication.  Patient was given instructions and counseling regarding his condition or for health maintenance advice. Please see specific information pulled into the AVS if appropriate.

## 2021-07-01 NOTE — ASSESSMENT & PLAN NOTE
Plan to use current statin therapy at bedtime dosing.  Total dose 40 mg.  Patient instructed not to change or modify this dosing regimen.  Dosing timing will be at bedtime.

## 2021-07-06 RX ORDER — CETIRIZINE HYDROCHLORIDE 10 MG/1
TABLET ORAL
Qty: 30 TABLET | Refills: 2 | Status: SHIPPED | OUTPATIENT
Start: 2021-07-06 | End: 2021-10-07 | Stop reason: SDUPTHER

## 2021-09-29 DIAGNOSIS — I63.9 CEREBROVASCULAR ACCIDENT (CVA), UNSPECIFIED MECHANISM (HCC): ICD-10-CM

## 2021-09-29 DIAGNOSIS — I10 ESSENTIAL HYPERTENSION: ICD-10-CM

## 2021-09-29 RX ORDER — CLOPIDOGREL BISULFATE 75 MG/1
TABLET ORAL
Qty: 30 TABLET | Refills: 0 | Status: SHIPPED | OUTPATIENT
Start: 2021-09-29 | End: 2021-10-07 | Stop reason: SDUPTHER

## 2021-09-29 RX ORDER — IRBESARTAN 300 MG/1
TABLET ORAL
Qty: 30 TABLET | Refills: 0 | Status: SHIPPED | OUTPATIENT
Start: 2021-09-29 | End: 2021-10-07 | Stop reason: SDUPTHER

## 2021-10-07 ENCOUNTER — OFFICE VISIT (OUTPATIENT)
Dept: FAMILY MEDICINE CLINIC | Facility: CLINIC | Age: 56
End: 2021-10-07

## 2021-10-07 VITALS
DIASTOLIC BLOOD PRESSURE: 94 MMHG | SYSTOLIC BLOOD PRESSURE: 146 MMHG | WEIGHT: 168 LBS | HEART RATE: 86 BPM | OXYGEN SATURATION: 95 % | BODY MASS INDEX: 24.88 KG/M2 | TEMPERATURE: 97.6 F | HEIGHT: 69 IN | RESPIRATION RATE: 16 BRPM

## 2021-10-07 DIAGNOSIS — J30.1 SEASONAL ALLERGIC RHINITIS DUE TO POLLEN: ICD-10-CM

## 2021-10-07 DIAGNOSIS — F41.1 GAD (GENERALIZED ANXIETY DISORDER): ICD-10-CM

## 2021-10-07 DIAGNOSIS — E78.49 OTHER HYPERLIPIDEMIA: ICD-10-CM

## 2021-10-07 DIAGNOSIS — I63.9 CEREBROVASCULAR ACCIDENT (CVA), UNSPECIFIED MECHANISM (HCC): ICD-10-CM

## 2021-10-07 DIAGNOSIS — I10 ESSENTIAL HYPERTENSION: Primary | ICD-10-CM

## 2021-10-07 DIAGNOSIS — B35.1 ONYCHOMYCOSIS: ICD-10-CM

## 2021-10-07 PROCEDURE — 99214 OFFICE O/P EST MOD 30 MIN: CPT | Performed by: FAMILY MEDICINE

## 2021-10-07 RX ORDER — CETIRIZINE HYDROCHLORIDE 10 MG/1
10 TABLET ORAL DAILY
Qty: 90 TABLET | Refills: 0 | Status: SHIPPED | OUTPATIENT
Start: 2021-10-07 | End: 2022-07-07 | Stop reason: SDUPTHER

## 2021-10-07 RX ORDER — ATORVASTATIN CALCIUM 40 MG/1
40 TABLET, FILM COATED ORAL NIGHTLY
Qty: 90 TABLET | Refills: 0 | Status: SHIPPED | OUTPATIENT
Start: 2021-10-07 | End: 2022-01-12 | Stop reason: SDUPTHER

## 2021-10-07 RX ORDER — METOPROLOL SUCCINATE 25 MG/1
25 TABLET, EXTENDED RELEASE ORAL NIGHTLY
Qty: 90 TABLET | Refills: 0 | Status: SHIPPED | OUTPATIENT
Start: 2021-10-07 | End: 2022-01-12 | Stop reason: SDUPTHER

## 2021-10-07 RX ORDER — CLOPIDOGREL BISULFATE 75 MG/1
75 TABLET ORAL DAILY
Qty: 90 TABLET | Refills: 0 | Status: SHIPPED | OUTPATIENT
Start: 2021-10-07 | End: 2022-01-12 | Stop reason: SDUPTHER

## 2021-10-07 RX ORDER — ALPRAZOLAM 1 MG/1
TABLET ORAL
Qty: 75 TABLET | Refills: 2 | Status: SHIPPED | OUTPATIENT
Start: 2021-10-07 | End: 2022-01-12 | Stop reason: SDUPTHER

## 2021-10-07 RX ORDER — IRBESARTAN 300 MG/1
300 TABLET ORAL NIGHTLY
Qty: 90 TABLET | Refills: 0 | Status: SHIPPED | OUTPATIENT
Start: 2021-10-07 | End: 2022-01-12 | Stop reason: SDUPTHER

## 2021-10-07 NOTE — PROGRESS NOTES
"Chief Complaint  Establish Care and Hypertension    Subjective          Raphael presents to [facility] to refill medicines. Blood pressure is improved but still above goal. Anxiety stable. Vicente report reviewed. He continues to take Plavix for history of CVA. Lipids are stable. Allergies are stable. He continues to smoke 2 to 3 cigarettes a day and I have advised him to quit. Labs are due  Patient has thickened right great toenail and would like referral to podiatry.         Objective   Vital Signs:   Vitals:    10/07/21 0950 10/07/21 1026   BP: 152/95 146/94   BP Location:  Left arm   Patient Position:  Sitting   Cuff Size:  Adult   Pulse: 86    Resp: 16    Temp: 97.6 °F (36.4 °C)    TempSrc: Skin    SpO2: 95%    Weight: 76.2 kg (168 lb)    Height: 175.3 cm (69\")         Physical Exam  Vitals reviewed.   Constitutional:       General: He is not in acute distress.  Eyes:      General: Lids are normal.      Conjunctiva/sclera: Conjunctivae normal.   Neck:      Vascular: No carotid bruit.      Trachea: No tracheal deviation.   Cardiovascular:      Rate and Rhythm: Normal rate and regular rhythm.      Heart sounds: Normal heart sounds. No murmur heard.     Pulmonary:      Effort: Pulmonary effort is normal.      Breath sounds: Normal breath sounds.   Skin:     General: Skin is warm and dry.      Comments: Onychomycosis right great toenail   Neurological:      Mental Status: He is alert. He is not disoriented.   Psychiatric:         Speech: Speech normal.         Behavior: Behavior normal. Behavior is cooperative.          Result Review :                Assessment and Plan    Diagnoses and all orders for this visit:    1. Essential hypertension (Primary)  Assessment & Plan:  Blood pressure improving. Add metoprolol to bedtime dosing    Orders:  -     irbesartan (AVAPRO) 300 MG tablet; Take 1 tablet by mouth Every Night for 90 days.  Dispense: 90 tablet; Refill: 0  -     metoprolol succinate " XL (Toprol XL) 25 MG 24 hr tablet; Take 1 tablet by mouth Every Night for 90 days.  Dispense: 90 tablet; Refill: 0    2. JAMEL (generalized anxiety disorder)  Assessment & Plan:  Condition is stable. The current medical regimen is effective;  continue present plan and medications.    Orders:  -     ALPRAZolam (Xanax) 1 MG tablet; Take one tablet BID and may take an additional one half tablet at night prn  Dispense: 75 tablet; Refill: 2    3. Seasonal allergic rhinitis due to pollen  Assessment & Plan:  Condition is stable. The current medical regimen is effective;  continue present plan and medications.    Orders:  -     ALPRAZolam (Xanax) 1 MG tablet; Take one tablet BID and may take an additional one half tablet at night prn  Dispense: 75 tablet; Refill: 2  -     cetirizine (zyrTEC) 10 MG tablet; Take 1 tablet by mouth Daily for 90 days.  Dispense: 90 tablet; Refill: 0    4. Cerebrovascular accident (CVA), unspecified mechanism (HCC)  Assessment & Plan:  Condition is stable. The current medical regimen is effective;  continue present plan and medications.    Orders:  -     atorvastatin (LIPITOR) 40 MG tablet; Take 1 tablet by mouth Every Night for 90 days.  Dispense: 90 tablet; Refill: 0  -     clopidogrel (PLAVIX) 75 MG tablet; Take 1 tablet by mouth Daily for 90 days.  Dispense: 90 tablet; Refill: 0    5. Other hyperlipidemia  Assessment & Plan:  Condition stable pending lab results. Continue same medication.    Orders:  -     atorvastatin (LIPITOR) 40 MG tablet; Take 1 tablet by mouth Every Night for 90 days.  Dispense: 90 tablet; Refill: 0    6. Onychomycosis  Assessment & Plan:  Referral to podiatry        Follow Up   Return in about 3 months (around 1/7/2022) for Adult wellness & medication appt, schedule 30 min, controlled substance visit.  Patient was given instructions and counseling regarding his condition or for health maintenance advice. Please see specific information pulled into the AVS if appropriate.

## 2022-01-04 DIAGNOSIS — F41.1 GAD (GENERALIZED ANXIETY DISORDER): ICD-10-CM

## 2022-01-04 DIAGNOSIS — J30.1 SEASONAL ALLERGIC RHINITIS DUE TO POLLEN: ICD-10-CM

## 2022-01-04 NOTE — TELEPHONE ENCOUNTER
Caller: Raphael Garcia    Relationship: Self    Best call back number: [phone] (H)    Requested Prescriptions:   Requested Prescriptions     Pending Prescriptions Disp Refills   • ALPRAZolam (Xanax) 1 MG tablet 75 tablet 2     Sig: Take one tablet BID and may take an additional one half tablet at night prn        Pharmacy where request should be sent: [place] [id number] [facility] [phone] [facility]     Additional details provided by patient:     Does the patient have less than a 3 day supply:  [] Yes  [x] No    Drea Spangler Rep   01/04/22 13:50 EST

## 2022-01-05 RX ORDER — ALPRAZOLAM 1 MG/1
TABLET ORAL
Qty: 75 TABLET | Refills: 2 | OUTPATIENT
Start: 2022-01-05

## 2022-01-07 DIAGNOSIS — F41.1 GAD (GENERALIZED ANXIETY DISORDER): ICD-10-CM

## 2022-01-07 DIAGNOSIS — J30.1 SEASONAL ALLERGIC RHINITIS DUE TO POLLEN: ICD-10-CM

## 2022-01-07 RX ORDER — ALPRAZOLAM 1 MG/1
TABLET ORAL
Qty: 75 TABLET | OUTPATIENT
Start: 2022-01-07

## 2022-01-12 ENCOUNTER — OFFICE VISIT (OUTPATIENT)
Dept: FAMILY MEDICINE CLINIC | Facility: CLINIC | Age: 57
End: 2022-01-12

## 2022-01-12 VITALS
HEIGHT: 69 IN | RESPIRATION RATE: 16 BRPM | DIASTOLIC BLOOD PRESSURE: 94 MMHG | BODY MASS INDEX: 25.18 KG/M2 | TEMPERATURE: 96.6 F | HEART RATE: 85 BPM | WEIGHT: 170 LBS | OXYGEN SATURATION: 95 % | SYSTOLIC BLOOD PRESSURE: 153 MMHG

## 2022-01-12 DIAGNOSIS — E78.49 OTHER HYPERLIPIDEMIA: ICD-10-CM

## 2022-01-12 DIAGNOSIS — I63.9 CEREBROVASCULAR ACCIDENT (CVA), UNSPECIFIED MECHANISM: ICD-10-CM

## 2022-01-12 DIAGNOSIS — R35.1 NOCTURIA: ICD-10-CM

## 2022-01-12 DIAGNOSIS — Z72.0 TOBACCO ABUSE: ICD-10-CM

## 2022-01-12 DIAGNOSIS — F41.1 GAD (GENERALIZED ANXIETY DISORDER): ICD-10-CM

## 2022-01-12 DIAGNOSIS — I10 ESSENTIAL HYPERTENSION: Primary | ICD-10-CM

## 2022-01-12 PROCEDURE — 99214 OFFICE O/P EST MOD 30 MIN: CPT | Performed by: FAMILY MEDICINE

## 2022-01-12 RX ORDER — CLOPIDOGREL BISULFATE 75 MG/1
75 TABLET ORAL DAILY
Qty: 90 TABLET | Refills: 0 | Status: SHIPPED | OUTPATIENT
Start: 2022-01-12 | End: 2022-04-11 | Stop reason: SDUPTHER

## 2022-01-12 RX ORDER — ATORVASTATIN CALCIUM 40 MG/1
40 TABLET, FILM COATED ORAL NIGHTLY
Qty: 90 TABLET | Refills: 0 | Status: SHIPPED | OUTPATIENT
Start: 2022-01-12 | End: 2022-04-11 | Stop reason: SDUPTHER

## 2022-01-12 RX ORDER — IRBESARTAN 300 MG/1
300 TABLET ORAL NIGHTLY
Qty: 90 TABLET | Refills: 0 | Status: SHIPPED | OUTPATIENT
Start: 2022-01-12 | End: 2022-04-11 | Stop reason: SDUPTHER

## 2022-01-12 RX ORDER — METOPROLOL SUCCINATE 25 MG/1
25 TABLET, EXTENDED RELEASE ORAL NIGHTLY
Qty: 90 TABLET | Refills: 0 | Status: SHIPPED | OUTPATIENT
Start: 2022-01-12 | End: 2022-04-11 | Stop reason: SDUPTHER

## 2022-01-12 RX ORDER — ALPRAZOLAM 1 MG/1
TABLET ORAL
Qty: 75 TABLET | Refills: 2 | Status: SHIPPED | OUTPATIENT
Start: 2022-01-12 | End: 2022-04-11 | Stop reason: SDUPTHER

## 2022-01-12 NOTE — PROGRESS NOTES
"Chief Complaint  Hypertension, Hyperlipidemia, and Anxiety    Adali Lim presents to [facility]  To refill medicines.  Labs from October have been reviewed and are satisfactory.  Blood pressure is above goal today.  He has not been taking metoprolol since about November.  No medicine side effects reported.  Vicente report is been reviewed.  Anxiety stable.  Lipids are well controlled.        Objective   Vital Signs:   Vitals:    01/12/22 1551   BP: 153/94   Pulse: 85   Resp: 16   Temp: 96.6 °F (35.9 °C)   SpO2: 95%   Weight: 77.1 kg (170 lb)   Height: 175.3 cm (69\")        Physical Exam  Vitals reviewed.   Constitutional:       General: He is not in acute distress.  Eyes:      General: Lids are normal.      Conjunctiva/sclera: Conjunctivae normal.   Neck:      Vascular: No carotid bruit.      Trachea: No tracheal deviation.   Cardiovascular:      Rate and Rhythm: Normal rate and regular rhythm.      Heart sounds: Normal heart sounds. No murmur heard.      Pulmonary:      Effort: Pulmonary effort is normal.      Breath sounds: Normal breath sounds.   Skin:     General: Skin is warm and dry.   Neurological:      Mental Status: He is alert. He is not disoriented.   Psychiatric:         Speech: Speech normal.         Behavior: Behavior normal. Behavior is cooperative.          Result Review :                Assessment and Plan    Diagnoses and all orders for this visit:    1. Essential hypertension (Primary)  Assessment & Plan:  Blood pressure is above goal.  Patient will resume metoprolol since he has been off of this medicine since November.  Continue with Irbesartan.  Recheck blood pressure in 3 months.    Orders:  -     irbesartan (AVAPRO) 300 MG tablet; Take 1 tablet by mouth Every Night for 90 days.  Dispense: 90 tablet; Refill: 0  -     metoprolol succinate XL (Toprol XL) 25 MG 24 hr tablet; Take 1 tablet by mouth Every Night for 90 days.  Dispense: 90 tablet; Refill: " 0  -     Comprehensive Metabolic Panel; Future  -     CBC & Differential; Future  -     TSH; Future    2. JAMEL (generalized anxiety disorder)  Assessment & Plan:  Condition is stable. The current medical regimen is effective;  continue present plan and medications.  Patient to bring in alprazolam pill bottle next visit.    Orders:  -     ALPRAZolam (Xanax) 1 MG tablet; Take one tablet BID and may take an additional one half tablet at night prn  Dispense: 75 tablet; Refill: 2    3. Cerebrovascular accident (CVA), unspecified mechanism (HCC)  Assessment & Plan:  Condition is stable. The current medical regimen is effective;  continue present plan and medications.    Orders:  -     atorvastatin (LIPITOR) 40 MG tablet; Take 1 tablet by mouth Every Night for 90 days.  Dispense: 90 tablet; Refill: 0  -     clopidogrel (PLAVIX) 75 MG tablet; Take 1 tablet by mouth Daily for 90 days.  Dispense: 90 tablet; Refill: 0  -     Lipid Panel With / Chol / HDL Ratio; Future    4. Other hyperlipidemia  Assessment & Plan:  Condition is stable. The current medical regimen is effective;  continue present plan and medications.    Orders:  -     atorvastatin (LIPITOR) 40 MG tablet; Take 1 tablet by mouth Every Night for 90 days.  Dispense: 90 tablet; Refill: 0  -     Lipid Panel With / Chol / HDL Ratio; Future  -     CK; Future    5. Nocturia  -     PSA DIAGNOSTIC; Future    6. Tobacco abuse  Assessment & Plan:  Improving.  Patient continuing to strive for 0 cigarettes daily.        Follow Up   Return in about 3 months (around 4/12/2022) for Adult wellness & medication appt, schedule 30 min.  Patient was given instructions and counseling regarding his condition or for health maintenance advice. Please see specific information pulled into the AVS if appropriate.

## 2022-01-12 NOTE — ASSESSMENT & PLAN NOTE
Condition is stable. The current medical regimen is effective;  continue present plan and medications.  Patient to bring in alprazolam pill bottle next visit.

## 2022-01-12 NOTE — ASSESSMENT & PLAN NOTE
Blood pressure is above goal.  Patient will resume metoprolol since he has been off of this medicine since November.  Continue with Irbesartan.  Recheck blood pressure in 3 months.

## 2022-04-11 ENCOUNTER — OFFICE VISIT (OUTPATIENT)
Dept: FAMILY MEDICINE CLINIC | Facility: CLINIC | Age: 57
End: 2022-04-11

## 2022-04-11 VITALS
OXYGEN SATURATION: 95 % | DIASTOLIC BLOOD PRESSURE: 80 MMHG | WEIGHT: 170 LBS | HEART RATE: 76 BPM | HEIGHT: 69 IN | BODY MASS INDEX: 25.18 KG/M2 | TEMPERATURE: 97.9 F | SYSTOLIC BLOOD PRESSURE: 143 MMHG | RESPIRATION RATE: 16 BRPM

## 2022-04-11 DIAGNOSIS — Z12.11 ENCOUNTER FOR COLORECTAL CANCER SCREENING: ICD-10-CM

## 2022-04-11 DIAGNOSIS — E78.49 OTHER HYPERLIPIDEMIA: ICD-10-CM

## 2022-04-11 DIAGNOSIS — Z86.73 HISTORY OF CVA (CEREBROVASCULAR ACCIDENT): ICD-10-CM

## 2022-04-11 DIAGNOSIS — Z23 IMMUNIZATION DUE: ICD-10-CM

## 2022-04-11 DIAGNOSIS — Z72.0 TOBACCO ABUSE: ICD-10-CM

## 2022-04-11 DIAGNOSIS — I63.9 CEREBROVASCULAR ACCIDENT (CVA), UNSPECIFIED MECHANISM: ICD-10-CM

## 2022-04-11 DIAGNOSIS — Z00.00 ENCOUNTER FOR WELLNESS EXAMINATION IN ADULT: Primary | ICD-10-CM

## 2022-04-11 DIAGNOSIS — I10 ESSENTIAL HYPERTENSION: ICD-10-CM

## 2022-04-11 DIAGNOSIS — F41.1 GAD (GENERALIZED ANXIETY DISORDER): ICD-10-CM

## 2022-04-11 DIAGNOSIS — Z12.12 ENCOUNTER FOR COLORECTAL CANCER SCREENING: ICD-10-CM

## 2022-04-11 PROCEDURE — 99396 PREV VISIT EST AGE 40-64: CPT | Performed by: FAMILY MEDICINE

## 2022-04-11 PROCEDURE — 90732 PPSV23 VACC 2 YRS+ SUBQ/IM: CPT | Performed by: FAMILY MEDICINE

## 2022-04-11 PROCEDURE — 90471 IMMUNIZATION ADMIN: CPT | Performed by: FAMILY MEDICINE

## 2022-04-11 RX ORDER — ALPRAZOLAM 1 MG/1
TABLET ORAL
Qty: 75 TABLET | Refills: 2 | Status: SHIPPED | OUTPATIENT
Start: 2022-04-11 | End: 2022-07-07 | Stop reason: SDUPTHER

## 2022-04-11 RX ORDER — METOPROLOL SUCCINATE 50 MG/1
50 TABLET, EXTENDED RELEASE ORAL NIGHTLY
Qty: 90 TABLET | Refills: 0 | Status: SHIPPED | OUTPATIENT
Start: 2022-04-11 | End: 2022-07-07 | Stop reason: SDUPTHER

## 2022-04-11 RX ORDER — CLOPIDOGREL BISULFATE 75 MG/1
75 TABLET ORAL DAILY
Qty: 90 TABLET | Refills: 0 | Status: SHIPPED | OUTPATIENT
Start: 2022-04-11 | End: 2022-07-07 | Stop reason: SDUPTHER

## 2022-04-11 RX ORDER — IRBESARTAN 300 MG/1
300 TABLET ORAL NIGHTLY
Qty: 90 TABLET | Refills: 0 | Status: SHIPPED | OUTPATIENT
Start: 2022-04-11 | End: 2022-07-07 | Stop reason: SDUPTHER

## 2022-04-11 RX ORDER — ATORVASTATIN CALCIUM 40 MG/1
40 TABLET, FILM COATED ORAL NIGHTLY
Qty: 90 TABLET | Refills: 0 | Status: SHIPPED | OUTPATIENT
Start: 2022-04-11 | End: 2022-04-27 | Stop reason: SDUPTHER

## 2022-04-11 NOTE — PROGRESS NOTES
"Chief Complaint  Annual Exam, Hypertension, Anxiety, and Hyperlipidemia    Adali Lim presents to [facility]          Objective   Vital Signs:   Vitals:    04/11/22 1427   BP: 143/80   Pulse: 76   Resp: 16   Temp: 97.9 °F (36.6 °C)   TempSrc: Skin   SpO2: 95%   Weight: 77.1 kg (170 lb)   Height: 175.3 cm (69\")                Physical Exam     Result Review :                Assessment and Plan    Diagnoses and all orders for this visit:    1. Encounter for wellness examination in adult (Primary)  Comments:  Continue with diet and exercise.  Pneumococcal vaccine is due.    2. Cerebrovascular accident (CVA), unspecified mechanism (HCC)  Assessment & Plan:  CVA 1 year ago.  No new symptoms.  Discontinued dual platelet inhibitors.  Continue with Plavix.  Patient will follow-up with office visit prior to upcoming dental procedure being scheduled.    Orders:  -     atorvastatin (LIPITOR) 40 MG tablet; Take 1 tablet by mouth Every Night for 90 days.  Dispense: 90 tablet; Refill: 0  -     clopidogrel (PLAVIX) 75 MG tablet; Take 1 tablet by mouth Daily for 90 days.  Dispense: 90 tablet; Refill: 0    3. Other hyperlipidemia  Assessment & Plan:  Repeat labs are due.  Continue same treatment for now.  LDL goal less than 70.    Orders:  -     atorvastatin (LIPITOR) 40 MG tablet; Take 1 tablet by mouth Every Night for 90 days.  Dispense: 90 tablet; Refill: 0    4. Essential hypertension  Assessment & Plan:  Blood pressure not fully to goal.  Increase metoprolol to 50 mg daily.  Continue with Irbesartan.  Recheck 3 months.    Orders:  -     irbesartan (AVAPRO) 300 MG tablet; Take 1 tablet by mouth Every Night for 90 days.  Dispense: 90 tablet; Refill: 0  -     metoprolol succinate XL (Toprol XL) 50 MG 24 hr tablet; Take 1 tablet by mouth Every Night for 90 days.  Dispense: 90 tablet; Refill: 0    5. JAMEL (generalized anxiety disorder)  Assessment & Plan:  Condition is stable. The current " medical regimen is effective;  continue present plan and medications.    Orders:  -     ALPRAZolam (Xanax) 1 MG tablet; Take one tablet BID and may take an additional one half tablet at night prn  Dispense: 75 tablet; Refill: 2    6. Encounter for colorectal cancer screening  -     Cologuard - Stool, Per Rectum; Future    7. History of CVA (cerebrovascular accident)  Assessment & Plan:  CVA 1 year ago.  No new symptoms.  Discontinued dual platelet inhibitors.  Continue with Plavix.  Patient will follow-up with office visit prior to upcoming dental procedure being scheduled.      8. Tobacco abuse  Assessment & Plan:  Abstinence from tobacco use strongly encouraged.      9. Immunization due  -     Pneumococcal Polysaccharide Vaccine 23-Valent Greater Than or Equal To 3yo Subcutaneous / IM        Follow Up   Return in about 3 months (around 7/11/2022) for recheck/refill medication.  Patient was given instructions and counseling regarding his condition or for health maintenance advice. Please see specific information pulled into the AVS if appropriate.

## 2022-04-11 NOTE — PROGRESS NOTES
Immunization  Immunization performed in LD by Ike Avelar MA. Patient tolerated the procedure well without complications.  04/11/22   Ike Avelar MA

## 2022-04-11 NOTE — ASSESSMENT & PLAN NOTE
Blood pressure not fully to goal.  Increase metoprolol to 50 mg daily.  Continue with Irbesartan.  Recheck 3 months.

## 2022-04-11 NOTE — PROGRESS NOTES
"Chief Complaint  Annual Exam, Hypertension, Anxiety, and Hyperlipidemia    Subjective          Raphael presents to [facility] for annual wellness visit.  He is also here to refill medicines.  Labs are due.  Preventative measures are due including colon cancer screening.  Overall he feels well.  No medication side effects noted.  Anxiety stable.  Vicente report is been reviewed.  He has 12 pills remaining in this prescription bottle dated 3/10/2022.    Review of Systems   Constitutional: Negative for fatigue.   HENT: Negative.    Eyes: Negative.    Respiratory: Negative.    Cardiovascular: Negative for chest pain and palpitations.   Gastrointestinal: Negative.    Endocrine: Negative for cold intolerance and heat intolerance.   Genitourinary: Negative for difficulty urinating.   Musculoskeletal: Positive for joint swelling (shoulder and back pain occaisionally). Negative for arthralgias.   Skin: Negative for color change and rash.   Allergic/Immunologic: Positive for environmental allergies.   Neurological: Negative for dizziness.   Hematological: Negative for adenopathy. Bruises/bleeds easily.   Psychiatric/Behavioral: Negative for dysphoric mood. The patient is not nervous/anxious.    All other systems reviewed and are negative.       Objective   Vital Signs:   Vitals:    04/11/22 1427   BP: 143/80   Pulse: 76   Resp: 16   Temp: 97.9 °F (36.6 °C)   TempSrc: Skin   SpO2: 95%   Weight: 77.1 kg (170 lb)   Height: 175.3 cm (69\")        BMI is above normal parameters. Recommendations: exercise counseling/recommendations and nutrition counseling/recommendations        Physical Exam  Vitals reviewed.   Constitutional:       General: He is not in acute distress.     Appearance: Normal appearance. He is well-developed.   HENT:      Head: Normocephalic.      Right Ear: Hearing, tympanic membrane and external ear normal.      Left Ear: Hearing, tympanic membrane and external ear normal.   Eyes:      " General: Lids are normal.      Conjunctiva/sclera: Conjunctivae normal.      Pupils: Pupils are equal, round, and reactive to light.      Funduscopic exam:     Right eye: No AV nicking or papilledema.         Left eye: No AV nicking or papilledema.   Neck:      Thyroid: No thyroid mass or thyromegaly.      Vascular: No carotid bruit or JVD.      Trachea: Trachea normal. No tracheal deviation.   Cardiovascular:      Rate and Rhythm: Normal rate and regular rhythm.      Pulses: Normal pulses.      Heart sounds: Normal heart sounds. No murmur heard.  Pulmonary:      Effort: Pulmonary effort is normal.      Breath sounds: Normal breath sounds.   Chest:   Breasts:      Right: No supraclavicular adenopathy.      Left: No supraclavicular adenopathy.       Abdominal:      General: Bowel sounds are normal.      Palpations: Abdomen is soft.      Tenderness: There is no abdominal tenderness.   Musculoskeletal:         General: Normal range of motion.      Cervical back: Normal range of motion and neck supple.      Lumbar back: No bony tenderness.   Lymphadenopathy:      Cervical: No cervical adenopathy.      Upper Body:      Right upper body: No supraclavicular adenopathy.      Left upper body: No supraclavicular adenopathy.   Skin:     General: Skin is warm and dry.      Findings: No rash.      Nails: There is no clubbing.   Neurological:      Mental Status: He is alert and oriented to person, place, and time. He is not disoriented.      Cranial Nerves: No cranial nerve deficit.      Deep Tendon Reflexes:      Reflex Scores:       Patellar reflexes are 2+ on the right side and 2+ on the left side.  Psychiatric:         Speech: Speech normal.         Behavior: Behavior normal. Behavior is cooperative.         Thought Content: Thought content normal.         Judgment: Judgment normal.          Result Review :                Assessment and Plan    Diagnoses and all orders for this visit:    1. Encounter for wellness examination  in adult (Primary)  Comments:  Continue with diet and exercise.  Pneumococcal vaccine is due.    2. Cerebrovascular accident (CVA), unspecified mechanism (HCC)  Assessment & Plan:  CVA 1 year ago.  No new symptoms.  Discontinued dual platelet inhibitors.  Continue with Plavix.  Patient will follow-up with office visit prior to upcoming dental procedure being scheduled.    Orders:  -     atorvastatin (LIPITOR) 40 MG tablet; Take 1 tablet by mouth Every Night for 90 days.  Dispense: 90 tablet; Refill: 0  -     clopidogrel (PLAVIX) 75 MG tablet; Take 1 tablet by mouth Daily for 90 days.  Dispense: 90 tablet; Refill: 0    3. Other hyperlipidemia  Assessment & Plan:  Repeat labs are due.  Continue same treatment for now.  LDL goal less than 70.    Orders:  -     atorvastatin (LIPITOR) 40 MG tablet; Take 1 tablet by mouth Every Night for 90 days.  Dispense: 90 tablet; Refill: 0    4. Essential hypertension  Assessment & Plan:  Blood pressure not fully to goal.  Increase metoprolol to 50 mg daily.  Continue with Irbesartan.  Recheck 3 months.    Orders:  -     irbesartan (AVAPRO) 300 MG tablet; Take 1 tablet by mouth Every Night for 90 days.  Dispense: 90 tablet; Refill: 0  -     metoprolol succinate XL (Toprol XL) 50 MG 24 hr tablet; Take 1 tablet by mouth Every Night for 90 days.  Dispense: 90 tablet; Refill: 0    5. JAMEL (generalized anxiety disorder)  Assessment & Plan:  Condition is stable. The current medical regimen is effective;  continue present plan and medications.    Orders:  -     ALPRAZolam (Xanax) 1 MG tablet; Take one tablet BID and may take an additional one half tablet at night prn  Dispense: 75 tablet; Refill: 2    6. Encounter for colorectal cancer screening  -     Cologuard - Stool, Per Rectum; Future    7. History of CVA (cerebrovascular accident)  Assessment & Plan:  CVA 1 year ago.  No new symptoms.  Discontinued dual platelet inhibitors.  Continue with Plavix.  Patient will follow-up with office  visit prior to upcoming dental procedure being scheduled.      8. Tobacco abuse  Assessment & Plan:  Abstinence from tobacco use strongly encouraged.      9. Immunization due  -     Pneumococcal Polysaccharide Vaccine 23-Valent Greater Than or Equal To 1yo Subcutaneous / IM      Follow Up   Return in about 3 months (around 7/11/2022) for recheck/refill medication.  Patient was given instructions and counseling regarding his condition or for health maintenance advice. Please see specific information pulled into the AVS if appropriate.

## 2022-04-11 NOTE — ASSESSMENT & PLAN NOTE
CVA 1 year ago.  No new symptoms.  Discontinued dual platelet inhibitors.  Continue with Plavix.  Patient will follow-up with office visit prior to upcoming dental procedure being scheduled.

## 2022-04-27 DIAGNOSIS — E78.49 OTHER HYPERLIPIDEMIA: ICD-10-CM

## 2022-04-27 DIAGNOSIS — I63.9 CEREBROVASCULAR ACCIDENT (CVA), UNSPECIFIED MECHANISM: ICD-10-CM

## 2022-04-27 RX ORDER — ATORVASTATIN CALCIUM 80 MG/1
80 TABLET, FILM COATED ORAL NIGHTLY
Qty: 90 TABLET | Refills: 0 | Status: SHIPPED | OUTPATIENT
Start: 2022-04-27 | End: 2022-07-07 | Stop reason: ALTCHOICE

## 2022-07-07 ENCOUNTER — OFFICE VISIT (OUTPATIENT)
Dept: FAMILY MEDICINE CLINIC | Facility: CLINIC | Age: 57
End: 2022-07-07

## 2022-07-07 VITALS
WEIGHT: 171 LBS | RESPIRATION RATE: 18 BRPM | OXYGEN SATURATION: 98 % | HEIGHT: 69 IN | SYSTOLIC BLOOD PRESSURE: 122 MMHG | DIASTOLIC BLOOD PRESSURE: 74 MMHG | HEART RATE: 87 BPM | TEMPERATURE: 98.4 F | BODY MASS INDEX: 25.33 KG/M2

## 2022-07-07 DIAGNOSIS — F41.1 GAD (GENERALIZED ANXIETY DISORDER): ICD-10-CM

## 2022-07-07 DIAGNOSIS — E78.49 OTHER HYPERLIPIDEMIA: Primary | ICD-10-CM

## 2022-07-07 DIAGNOSIS — I63.9 CEREBROVASCULAR ACCIDENT (CVA), UNSPECIFIED MECHANISM: ICD-10-CM

## 2022-07-07 DIAGNOSIS — J30.1 SEASONAL ALLERGIC RHINITIS DUE TO POLLEN: ICD-10-CM

## 2022-07-07 DIAGNOSIS — Z86.73 HISTORY OF CVA (CEREBROVASCULAR ACCIDENT): ICD-10-CM

## 2022-07-07 DIAGNOSIS — Z72.0 TOBACCO ABUSE: ICD-10-CM

## 2022-07-07 DIAGNOSIS — I10 ESSENTIAL HYPERTENSION: ICD-10-CM

## 2022-07-07 PROCEDURE — 99214 OFFICE O/P EST MOD 30 MIN: CPT | Performed by: FAMILY MEDICINE

## 2022-07-07 RX ORDER — ALPRAZOLAM 1 MG/1
TABLET ORAL
Qty: 75 TABLET | Refills: 2 | Status: SHIPPED | OUTPATIENT
Start: 2022-07-07 | End: 2022-10-10 | Stop reason: SDUPTHER

## 2022-07-07 RX ORDER — ASPIRIN 81 MG/1
81 TABLET ORAL DAILY
COMMUNITY

## 2022-07-07 RX ORDER — ROSUVASTATIN CALCIUM 40 MG/1
40 TABLET, COATED ORAL NIGHTLY
Qty: 90 TABLET | Refills: 0 | Status: SHIPPED | OUTPATIENT
Start: 2022-07-07 | End: 2022-10-10 | Stop reason: SDUPTHER

## 2022-07-07 RX ORDER — CLOPIDOGREL BISULFATE 75 MG/1
75 TABLET ORAL DAILY
Qty: 90 TABLET | Refills: 0 | Status: SHIPPED | OUTPATIENT
Start: 2022-07-07 | End: 2022-10-10 | Stop reason: SDUPTHER

## 2022-07-07 RX ORDER — IRBESARTAN 300 MG/1
300 TABLET ORAL NIGHTLY
Qty: 90 TABLET | Refills: 0 | Status: SHIPPED | OUTPATIENT
Start: 2022-07-07 | End: 2022-10-10 | Stop reason: SDUPTHER

## 2022-07-07 RX ORDER — METOPROLOL SUCCINATE 50 MG/1
50 TABLET, EXTENDED RELEASE ORAL NIGHTLY
Qty: 90 TABLET | Refills: 0 | Status: SHIPPED | OUTPATIENT
Start: 2022-07-07 | End: 2022-10-10 | Stop reason: SDUPTHER

## 2022-07-07 RX ORDER — CETIRIZINE HYDROCHLORIDE 10 MG/1
10 TABLET ORAL DAILY
Qty: 90 TABLET | Refills: 0 | Status: SHIPPED | OUTPATIENT
Start: 2022-07-07 | End: 2022-10-10 | Stop reason: SDUPTHER

## 2022-07-07 NOTE — ASSESSMENT & PLAN NOTE
Stop atorvastatin due to muscle cramps.  Changed to a atorvastatin 40 mg at bedtime dosing.  Repeat labs prior to 3-month visit.

## 2022-07-07 NOTE — PROGRESS NOTES
"Chief Complaint  Hypertension (Med refills )    Subjective     {Problem List  Visit Diagnosis  Review (Popup)  Encounters  Notes  Medications  Labs  Result Review Imaging  Media :23}     Raphael presents to [facility]  To refill medicines.  Blood pressure controlled.  Anxiety stable.  Vicente report reviewed.  He continues to take Zyrtec for allergies.  He has not been able to tolerate the atorvastatin due to severe leg cramps mostly in the morning.  We will switch to different therapy and do repeat labs prior to 3-month visit.  He continues with low-dose aspirin therapy for history of CVA in the past.  Once again advised patient to quit smoking.        Objective   Vital Signs:   Vitals:    07/07/22 1127   BP: 122/74   Pulse: 87   Resp: 18   Temp: 98.4 °F (36.9 °C)   SpO2: 98%   Weight: 77.6 kg (171 lb)   Height: 175.3 cm (69\")                Physical Exam  Vitals reviewed.   Constitutional:       General: He is not in acute distress.  Eyes:      General: Lids are normal.      Conjunctiva/sclera: Conjunctivae normal.   Neck:      Vascular: No carotid bruit.      Trachea: No tracheal deviation.   Cardiovascular:      Rate and Rhythm: Normal rate and regular rhythm.      Heart sounds: Normal heart sounds. No murmur heard.  Pulmonary:      Effort: Pulmonary effort is normal.      Breath sounds: Normal breath sounds.   Skin:     General: Skin is warm and dry.   Neurological:      Mental Status: He is alert. He is not disoriented.   Psychiatric:         Speech: Speech normal.         Behavior: Behavior normal. Behavior is cooperative.          Result Review :                Assessment and Plan    Diagnoses and all orders for this visit:    1. Other hyperlipidemia (Primary)  Assessment & Plan:  Stop atorvastatin due to muscle cramps.  Changed to a atorvastatin 40 mg at bedtime dosing.  Repeat labs prior to 3-month visit.    Orders:  -     rosuvastatin (CRESTOR) 40 MG tablet; Take 1 " tablet by mouth Every Night for 90 days.  Dispense: 90 tablet; Refill: 0    2. Cerebrovascular accident (CVA), unspecified mechanism (HCC)  -     rosuvastatin (CRESTOR) 40 MG tablet; Take 1 tablet by mouth Every Night for 90 days.  Dispense: 90 tablet; Refill: 0  -     clopidogrel (PLAVIX) 75 MG tablet; Take 1 tablet by mouth Daily for 90 days.  Dispense: 90 tablet; Refill: 0    3. Seasonal allergic rhinitis due to pollen  Assessment & Plan:  The current medical regimen is effective;  continue present plan and medications.      Orders:  -     cetirizine (zyrTEC) 10 MG tablet; Take 1 tablet by mouth Daily for 90 days.  Dispense: 90 tablet; Refill: 0    4. Essential hypertension  Assessment & Plan:  The current medical regimen is effective;  continue present plan and medications.      Orders:  -     irbesartan (AVAPRO) 300 MG tablet; Take 1 tablet by mouth Every Night for 90 days.  Dispense: 90 tablet; Refill: 0  -     metoprolol succinate XL (Toprol XL) 50 MG 24 hr tablet; Take 1 tablet by mouth Every Night for 90 days.  Dispense: 90 tablet; Refill: 0    5. JAMEL (generalized anxiety disorder)  Assessment & Plan:  The current medical regimen is effective;  continue present plan and medications.      Orders:  -     ALPRAZolam (Xanax) 1 MG tablet; Take one tablet BID and may take an additional one half tablet at night prn  Dispense: 75 tablet; Refill: 2    6. History of CVA (cerebrovascular accident)  Assessment & Plan:  The current medical regimen is effective;  continue present plan and medications.        7. Tobacco abuse  Assessment & Plan:  Advised patient to quit smoking.        Follow Up   Return in about 3 months (around 10/7/2022) for recheck/refill medication.  Patient was given instructions and counseling regarding his condition or for health maintenance advice. Please see specific information pulled into the AVS if appropriate.

## 2022-10-10 ENCOUNTER — OFFICE VISIT (OUTPATIENT)
Dept: FAMILY MEDICINE CLINIC | Facility: CLINIC | Age: 57
End: 2022-10-10

## 2022-10-10 VITALS
OXYGEN SATURATION: 95 % | RESPIRATION RATE: 16 BRPM | WEIGHT: 174 LBS | HEART RATE: 68 BPM | SYSTOLIC BLOOD PRESSURE: 124 MMHG | TEMPERATURE: 97.8 F | HEIGHT: 69 IN | BODY MASS INDEX: 25.77 KG/M2 | DIASTOLIC BLOOD PRESSURE: 66 MMHG

## 2022-10-10 DIAGNOSIS — I63.9 CEREBROVASCULAR ACCIDENT (CVA), UNSPECIFIED MECHANISM: ICD-10-CM

## 2022-10-10 DIAGNOSIS — I10 ESSENTIAL HYPERTENSION: ICD-10-CM

## 2022-10-10 DIAGNOSIS — E78.49 OTHER HYPERLIPIDEMIA: ICD-10-CM

## 2022-10-10 DIAGNOSIS — F41.1 GAD (GENERALIZED ANXIETY DISORDER): ICD-10-CM

## 2022-10-10 DIAGNOSIS — J30.1 SEASONAL ALLERGIC RHINITIS DUE TO POLLEN: ICD-10-CM

## 2022-10-10 PROBLEM — F41.9 ANXIETY: Status: ACTIVE | Noted: 2022-10-10

## 2022-10-10 PROBLEM — I65.23 BILATERAL CAROTID ARTERY STENOSIS: Status: ACTIVE | Noted: 2022-08-22

## 2022-10-10 PROBLEM — I77.9 CAROTID ARTERY DISEASE: Status: ACTIVE | Noted: 2022-07-13

## 2022-10-10 PROBLEM — Z87.19 H/O GASTROESOPHAGEAL REFLUX (GERD): Status: ACTIVE | Noted: 2022-10-10

## 2022-10-10 PROCEDURE — 99214 OFFICE O/P EST MOD 30 MIN: CPT | Performed by: FAMILY MEDICINE

## 2022-10-10 RX ORDER — CLOPIDOGREL BISULFATE 75 MG/1
75 TABLET ORAL DAILY
Qty: 90 TABLET | Refills: 0 | Status: SHIPPED | OUTPATIENT
Start: 2022-10-10 | End: 2023-01-05 | Stop reason: SDUPTHER

## 2022-10-10 RX ORDER — METOPROLOL SUCCINATE 50 MG/1
50 TABLET, EXTENDED RELEASE ORAL NIGHTLY
Qty: 90 TABLET | Refills: 0 | Status: SHIPPED | OUTPATIENT
Start: 2022-10-10 | End: 2023-01-05 | Stop reason: SDUPTHER

## 2022-10-10 RX ORDER — ROSUVASTATIN CALCIUM 40 MG/1
40 TABLET, COATED ORAL NIGHTLY
Qty: 90 TABLET | Refills: 0 | Status: SHIPPED | OUTPATIENT
Start: 2022-10-10 | End: 2023-01-05 | Stop reason: SDUPTHER

## 2022-10-10 RX ORDER — CETIRIZINE HYDROCHLORIDE 10 MG/1
10 TABLET ORAL DAILY
Qty: 90 TABLET | Refills: 0 | Status: SHIPPED | OUTPATIENT
Start: 2022-10-10 | End: 2023-01-05 | Stop reason: SDUPTHER

## 2022-10-10 RX ORDER — ALPRAZOLAM 1 MG/1
TABLET ORAL
Qty: 75 TABLET | Refills: 2 | Status: SHIPPED | OUTPATIENT
Start: 2022-10-10 | End: 2023-01-05 | Stop reason: SDUPTHER

## 2022-10-10 RX ORDER — IRBESARTAN 300 MG/1
300 TABLET ORAL NIGHTLY
Qty: 90 TABLET | Refills: 0 | Status: SHIPPED | OUTPATIENT
Start: 2022-10-10 | End: 2023-01-05 | Stop reason: SDUPTHER

## 2022-10-10 RX ORDER — IBUPROFEN 400 MG/1
400 TABLET ORAL
COMMUNITY

## 2022-10-10 NOTE — PROGRESS NOTES
"Chief Complaint  Hyperlipidemia (Med f/u)    Adali Lim presents to [facility]  To refill medications. Overall he feels well. BP is controlled. He has had left carotid endarterectomy. Labs are due.    Vicente reviewed. Anxiety stable.      Objective   Vital Signs:   Vitals:    10/10/22 1143   BP: 124/66   Pulse: 68   Resp: 16   Temp: 97.8 °F (36.6 °C)   SpO2: 95%   Weight: 78.9 kg (174 lb)   Height: 175.3 cm (69\")                Physical Exam  Vitals reviewed.   Constitutional:       General: He is not in acute distress.  Eyes:      General: Lids are normal.      Conjunctiva/sclera: Conjunctivae normal.   Neck:      Vascular: No carotid bruit.      Trachea: No tracheal deviation.     Cardiovascular:      Rate and Rhythm: Normal rate and regular rhythm.      Heart sounds: Normal heart sounds. No murmur heard.  Pulmonary:      Effort: Pulmonary effort is normal.      Breath sounds: Normal breath sounds.   Skin:     General: Skin is warm and dry.   Neurological:      Mental Status: He is alert. He is not disoriented.   Psychiatric:         Speech: Speech normal.         Behavior: Behavior normal. Behavior is cooperative.          Result Review :                Assessment and Plan    Diagnoses and all orders for this visit:    1. Cerebrovascular accident (CVA), unspecified mechanism (HCC)  -     rosuvastatin (CRESTOR) 40 MG tablet; Take 1 tablet by mouth Every Night for 90 days.  Dispense: 90 tablet; Refill: 0  -     clopidogrel (PLAVIX) 75 MG tablet; Take 1 tablet by mouth Daily for 90 days.  Dispense: 90 tablet; Refill: 0    2. Other hyperlipidemia  Assessment & Plan:  The current medical regimen is effective;  continue present plan and medications.      Orders:  -     rosuvastatin (CRESTOR) 40 MG tablet; Take 1 tablet by mouth Every Night for 90 days.  Dispense: 90 tablet; Refill: 0    3. Seasonal allergic rhinitis due to pollen  Assessment & Plan:  The current medical " regimen is effective;  continue present plan and medications.      Orders:  -     cetirizine (zyrTEC) 10 MG tablet; Take 1 tablet by mouth Daily for 90 days.  Dispense: 90 tablet; Refill: 0    4. Essential hypertension  Assessment & Plan:  The current medical regimen is effective;  continue present plan and medications.      Orders:  -     irbesartan (AVAPRO) 300 MG tablet; Take 1 tablet by mouth Every Night for 90 days.  Dispense: 90 tablet; Refill: 0  -     metoprolol succinate XL (Toprol XL) 50 MG 24 hr tablet; Take 1 tablet by mouth Every Night for 90 days.  Dispense: 90 tablet; Refill: 0    5. JAMEL (generalized anxiety disorder)  Assessment & Plan:  The current medical regimen is effective;  continue present plan and medications.      Orders:  -     ALPRAZolam (Xanax) 1 MG tablet; Take one tablet BID and may take an additional one half tablet at night prn  Dispense: 75 tablet; Refill: 2      Follow Up   Return in about 3 months (around 1/10/2023) for recheck/refill medication.  Patient was given instructions and counseling regarding his condition or for health maintenance advice. Please see specific information pulled into the AVS if appropriate.

## 2023-01-05 ENCOUNTER — OFFICE VISIT (OUTPATIENT)
Dept: FAMILY MEDICINE CLINIC | Facility: CLINIC | Age: 58
End: 2023-01-05
Payer: COMMERCIAL

## 2023-01-05 VITALS
SYSTOLIC BLOOD PRESSURE: 132 MMHG | BODY MASS INDEX: 25.92 KG/M2 | TEMPERATURE: 98.6 F | WEIGHT: 175 LBS | HEIGHT: 69 IN | RESPIRATION RATE: 18 BRPM | OXYGEN SATURATION: 96 % | HEART RATE: 72 BPM | DIASTOLIC BLOOD PRESSURE: 72 MMHG

## 2023-01-05 DIAGNOSIS — J30.1 SEASONAL ALLERGIC RHINITIS DUE TO POLLEN: ICD-10-CM

## 2023-01-05 DIAGNOSIS — I63.9 CEREBROVASCULAR ACCIDENT (CVA), UNSPECIFIED MECHANISM: ICD-10-CM

## 2023-01-05 DIAGNOSIS — E78.49 OTHER HYPERLIPIDEMIA: ICD-10-CM

## 2023-01-05 DIAGNOSIS — I10 ESSENTIAL HYPERTENSION: Primary | ICD-10-CM

## 2023-01-05 DIAGNOSIS — F41.1 GAD (GENERALIZED ANXIETY DISORDER): ICD-10-CM

## 2023-01-05 PROCEDURE — 99214 OFFICE O/P EST MOD 30 MIN: CPT | Performed by: FAMILY MEDICINE

## 2023-01-05 RX ORDER — CLOPIDOGREL BISULFATE 75 MG/1
75 TABLET ORAL DAILY
Qty: 90 TABLET | Refills: 0 | Status: SHIPPED | OUTPATIENT
Start: 2023-01-05 | End: 2023-04-05

## 2023-01-05 RX ORDER — IRBESARTAN 300 MG/1
300 TABLET ORAL NIGHTLY
Qty: 90 TABLET | Refills: 0 | Status: SHIPPED | OUTPATIENT
Start: 2023-01-05 | End: 2023-04-05

## 2023-01-05 RX ORDER — METOPROLOL SUCCINATE 50 MG/1
50 TABLET, EXTENDED RELEASE ORAL NIGHTLY
Qty: 90 TABLET | Refills: 0 | Status: SHIPPED | OUTPATIENT
Start: 2023-01-05 | End: 2023-04-05

## 2023-01-05 RX ORDER — ALPRAZOLAM 1 MG/1
TABLET ORAL
Qty: 75 TABLET | Refills: 2 | Status: SHIPPED | OUTPATIENT
Start: 2023-01-05

## 2023-01-05 RX ORDER — CETIRIZINE HYDROCHLORIDE 10 MG/1
10 TABLET ORAL DAILY
Qty: 90 TABLET | Refills: 0 | Status: SHIPPED | OUTPATIENT
Start: 2023-01-05 | End: 2023-04-05

## 2023-01-05 RX ORDER — ROSUVASTATIN CALCIUM 40 MG/1
40 TABLET, COATED ORAL NIGHTLY
Qty: 90 TABLET | Refills: 0 | Status: SHIPPED | OUTPATIENT
Start: 2023-01-05 | End: 2023-04-05

## 2023-01-05 NOTE — PROGRESS NOTES
Chief Complaint  Follow-up (3 month f/u)    Adali Lim presents to [facility]     To refill medications.  Labs are due no medication side effects are reported. Overall the patient is feeling well.  Vicente report reviewed        Objective   Vital Signs:   Vitals:    01/05/23 0827   BP: 132/72   Pulse: 72   Resp: 18   Temp: 98.6 °F (37 °C)   SpO2: 96%   Weight: 79.4 kg (175 lb)   Height: 175.3 cm (69\")                Physical Exam  Vitals reviewed.   Constitutional:       General: He is not in acute distress.  Eyes:      General: Lids are normal.      Conjunctiva/sclera: Conjunctivae normal.   Neck:      Vascular: No carotid bruit.      Trachea: No tracheal deviation.   Cardiovascular:      Rate and Rhythm: Normal rate and regular rhythm.      Heart sounds: Normal heart sounds. No murmur heard.  Pulmonary:      Effort: Pulmonary effort is normal.      Breath sounds: Normal breath sounds.   Skin:     General: Skin is warm and dry.   Neurological:      Mental Status: He is alert. He is not disoriented.   Psychiatric:         Speech: Speech normal.         Behavior: Behavior normal. Behavior is cooperative.          Result Review :                Assessment and Plan    Diagnoses and all orders for this visit:    1. Essential hypertension (Primary)  Assessment & Plan:  Hypertension is unchanged.  Continue current treatment regimen.  Blood pressure will be reassessed in 3 months.    Orders:  -     irbesartan (AVAPRO) 300 MG tablet; Take 1 tablet by mouth Every Night for 90 days.  Dispense: 90 tablet; Refill: 0  -     metoprolol succinate XL (Toprol XL) 50 MG 24 hr tablet; Take 1 tablet by mouth Every Night for 90 days.  Dispense: 90 tablet; Refill: 0    2. JAMEL (generalized anxiety disorder)  Assessment & Plan:  The current medical regimen is effective;  continue present plan and medications.      Orders:  -     ALPRAZolam (Xanax) 1 MG tablet; Take one tablet BID and may take an  additional one half tablet at night prn  Dispense: 75 tablet; Refill: 2    3. Other hyperlipidemia  Assessment & Plan:  The current medical regimen is effective;  continue present plan and medications.      Orders:  -     rosuvastatin (CRESTOR) 40 MG tablet; Take 1 tablet by mouth Every Night for 90 days.  Dispense: 90 tablet; Refill: 0    4. Cerebrovascular accident (CVA), unspecified mechanism (HCC)  -     rosuvastatin (CRESTOR) 40 MG tablet; Take 1 tablet by mouth Every Night for 90 days.  Dispense: 90 tablet; Refill: 0  -     clopidogrel (PLAVIX) 75 MG tablet; Take 1 tablet by mouth Daily for 90 days.  Dispense: 90 tablet; Refill: 0    5. Seasonal allergic rhinitis due to pollen  Assessment & Plan:  The current medical regimen is effective;  continue present plan and medications.      Orders:  -     cetirizine (zyrTEC) 10 MG tablet; Take 1 tablet by mouth Daily for 90 days.  Dispense: 90 tablet; Refill: 0      Follow Up   Return in about 3 months (around 4/12/2023) for Adult wellness & medication appt, schedule 30 min.  Patient was given instructions and counseling regarding his condition or for health maintenance advice. Please see specific information pulled into the AVS if appropriate.

## 2023-01-05 NOTE — ASSESSMENT & PLAN NOTE
Hypertension is unchanged.  Continue current treatment regimen.  Blood pressure will be reassessed in 3 months.

## 2023-04-10 ENCOUNTER — OFFICE VISIT (OUTPATIENT)
Dept: FAMILY MEDICINE CLINIC | Facility: CLINIC | Age: 58
End: 2023-04-10
Payer: COMMERCIAL

## 2023-04-10 VITALS
HEART RATE: 76 BPM | SYSTOLIC BLOOD PRESSURE: 168 MMHG | TEMPERATURE: 98 F | DIASTOLIC BLOOD PRESSURE: 85 MMHG | HEIGHT: 69 IN | RESPIRATION RATE: 16 BRPM | WEIGHT: 176.4 LBS | BODY MASS INDEX: 26.13 KG/M2 | OXYGEN SATURATION: 95 %

## 2023-04-10 DIAGNOSIS — F41.1 GAD (GENERALIZED ANXIETY DISORDER): ICD-10-CM

## 2023-04-10 DIAGNOSIS — I63.9 CEREBROVASCULAR ACCIDENT (CVA), UNSPECIFIED MECHANISM: ICD-10-CM

## 2023-04-10 DIAGNOSIS — J30.1 SEASONAL ALLERGIC RHINITIS DUE TO POLLEN: ICD-10-CM

## 2023-04-10 DIAGNOSIS — I10 ESSENTIAL HYPERTENSION: Primary | ICD-10-CM

## 2023-04-10 DIAGNOSIS — E78.49 OTHER HYPERLIPIDEMIA: ICD-10-CM

## 2023-04-10 RX ORDER — CLOPIDOGREL BISULFATE 75 MG/1
75 TABLET ORAL DAILY
Qty: 90 TABLET | Refills: 0 | Status: SHIPPED | OUTPATIENT
Start: 2023-04-10 | End: 2023-04-10

## 2023-04-10 RX ORDER — IRBESARTAN 300 MG/1
300 TABLET ORAL NIGHTLY
Qty: 90 TABLET | Refills: 0 | Status: SHIPPED | OUTPATIENT
Start: 2023-04-10 | End: 2023-07-09

## 2023-04-10 RX ORDER — ALPRAZOLAM 1 MG/1
TABLET ORAL
Qty: 75 TABLET | Refills: 2 | Status: SHIPPED | OUTPATIENT
Start: 2023-04-10

## 2023-04-10 RX ORDER — CETIRIZINE HYDROCHLORIDE 10 MG/1
10 TABLET ORAL DAILY
Qty: 90 TABLET | Refills: 0 | Status: SHIPPED | OUTPATIENT
Start: 2023-04-10 | End: 2023-07-09

## 2023-04-10 RX ORDER — ROSUVASTATIN CALCIUM 40 MG/1
40 TABLET, COATED ORAL NIGHTLY
Qty: 90 TABLET | Refills: 0 | Status: SHIPPED | OUTPATIENT
Start: 2023-04-10 | End: 2023-07-09

## 2023-04-10 RX ORDER — AMLODIPINE BESYLATE 5 MG/1
5 TABLET ORAL NIGHTLY
Qty: 90 TABLET | Refills: 0 | Status: SHIPPED | OUTPATIENT
Start: 2023-04-10 | End: 2023-07-09

## 2023-04-10 RX ORDER — METOPROLOL SUCCINATE 50 MG/1
50 TABLET, EXTENDED RELEASE ORAL NIGHTLY
Qty: 90 TABLET | Refills: 0 | Status: SHIPPED | OUTPATIENT
Start: 2023-04-10 | End: 2023-07-09

## 2023-04-10 NOTE — PROGRESS NOTES
"Chief Complaint  Follow-up and Med Refill    Subjective          Raphael presents to [facility] for medication refills. Overall he feels well. Good medication compliance is reported. No medication side effects are reported.  Labs are pending          Objective   Vital Signs:   Vitals:    04/10/23 0948   BP: 168/85   BP Location: Right arm   Patient Position: Sitting   Pulse: 76   Resp: 16   Temp: 98 °F (36.7 °C)   TempSrc: Oral   SpO2: 95%   Weight: 80 kg (176 lb 6.4 oz)   Height: 175.3 cm (69.02\")                Physical Exam     Result Review :                Assessment and Plan    Diagnoses and all orders for this visit:    1. Essential hypertension (Primary)  Assessment & Plan:  Blood pressure above goal.  We will add amlodipine 5 mg to current regimen.  Recheck 3 months.    Orders:  -     irbesartan (AVAPRO) 300 MG tablet; Take 1 tablet by mouth Every Night for 90 days.  Dispense: 90 tablet; Refill: 0  -     metoprolol succinate XL (Toprol XL) 50 MG 24 hr tablet; Take 1 tablet by mouth Every Night for 90 days.  Dispense: 90 tablet; Refill: 0  -     amLODIPine (NORVASC) 5 MG tablet; Take 1 tablet by mouth Every Night for 90 days.  Dispense: 90 tablet; Refill: 0    2. JAMEL (generalized anxiety disorder)  Assessment & Plan:  Condition is stable. The current medical regimen is effective;  continue present plan and medications.    Orders:  -     ALPRAZolam (Xanax) 1 MG tablet; Take one tablet BID and may take an additional one half tablet at night prn  Dispense: 75 tablet; Refill: 2    3. Seasonal allergic rhinitis due to pollen  Assessment & Plan:  Condition is stable. The current medical regimen is effective;  continue present plan and medications.    Orders:  -     cetirizine (zyrTEC) 10 MG tablet; Take 1 tablet by mouth Daily for 90 days.  Dispense: 90 tablet; Refill: 0    4. Cerebrovascular accident (CVA), unspecified mechanism  -     Discontinue: clopidogrel (PLAVIX) 75 MG tablet; " Take 1 tablet by mouth Daily for 90 days.  Dispense: 90 tablet; Refill: 0  -     rosuvastatin (CRESTOR) 40 MG tablet; Take 1 tablet by mouth Every Night for 90 days.  Dispense: 90 tablet; Refill: 0    5. Other hyperlipidemia  Assessment & Plan:  Condition is stable. The current medical regimen is effective;  continue present plan and medications.  Labs pending    Orders:  -     rosuvastatin (CRESTOR) 40 MG tablet; Take 1 tablet by mouth Every Night for 90 days.  Dispense: 90 tablet; Refill: 0      Follow Up   Return in about 3 months (around 7/10/2023) for Adult wellness & medication appt, schedule 30 min.  Patient was given instructions and counseling regarding his condition or for health maintenance advice. Please see specific information pulled into the AVS if appropriate.

## 2023-04-10 NOTE — ASSESSMENT & PLAN NOTE
Blood pressure above goal.  We will add amlodipine 5 mg to current regimen.  Recheck 3 months.  
Condition is stable. The current medical regimen is effective;  continue present plan and medications.  
Condition is stable. The current medical regimen is effective;  continue present plan and medications.  
Condition is stable. The current medical regimen is effective;  continue present plan and medications.  Labs pending  
home

## 2023-07-05 PROBLEM — D72.829 LEUKOCYTOSIS: Status: ACTIVE | Noted: 2023-07-05

## 2023-08-23 NOTE — PROGRESS NOTES
.     REASON FOR CONSULTATION:   Leukocytosis  Provide an opinion on any further workup or treatment                             REQUESTING PHYSICIAN: Doroteo Mckay MD  RECORDS OBTAINED:  Records of the patient's history including those obtained from the referring provider were reviewed and summarized in detail.    HISTORY OF PRESENT ILLNESS:  The patient is a 58 y.o. year old male  who is here for follow-up with the above-mentioned history.    April 2022-July 2023: WBC 9.9-15.9  Predominance of mature segmented neutrophils.    Reviewed last PCP note, 7/5/2023 which was follow-up of hypertension and generalized anxiety disorder.  Leukocytosis noted and referred to us.  It was also noted that he is a smoker    Patient smokes between 2 and 6 cigarettes/day.  Denies any known inflammatory problems.  Denies recurrent or unusual infections.  Denies fever, chills, weight loss, night sweats    Past Medical History:   Diagnosis Date    CAD (coronary artery disease)     JAMEL (generalized anxiety disorder)     History of colonoscopy     never    Hyperlipidemia     Hypertension     Screening for prostate cancer     never    Stroke 2021     Past Surgical History:   Procedure Laterality Date    EYE SURGERY      WISDOM TOOTH EXTRACTION  05/02/2015       MEDICATIONS    Current Outpatient Medications:     ALPRAZolam (Xanax) 1 MG tablet, Take one tablet BID and may take an additional one half tablet at night prn, Disp: 75 tablet, Rfl: 2    amLODIPine (NORVASC) 5 MG tablet, Take 1 tablet by mouth Every Night for 90 days., Disp: 90 tablet, Rfl: 0    aspirin 81 MG EC tablet, Take 1 tablet by mouth Daily., Disp: , Rfl:     cetirizine (zyrTEC) 10 MG tablet, Take 1 tablet by mouth Daily for 90 days., Disp: 90 tablet, Rfl: 0    ibuprofen (ADVIL,MOTRIN) 400 MG tablet, Take 1 tablet by mouth., Disp: , Rfl:     irbesartan (AVAPRO) 300 MG tablet, Take 1 tablet by mouth Every Night for 90 days., Disp: 90 tablet, Rfl: 0    metoprolol  "succinate XL (Toprol XL) 50 MG 24 hr tablet, Take 1 tablet by mouth Every Night for 90 days., Disp: 90 tablet, Rfl: 0    rosuvastatin (CRESTOR) 40 MG tablet, Take 1 tablet by mouth Every Night for 90 days., Disp: 90 tablet, Rfl: 0    ALLERGIES:     Allergies   Allergen Reactions    Atorvastatin Myalgia       SOCIAL HISTORY:       Social History     Socioeconomic History    Marital status:      Spouse name: Alicja    Number of children: 2   Tobacco Use    Smoking status: Every Day     Packs/day: 1.00     Years: 40.00     Pack years: 40.00     Types: Cigarettes    Smokeless tobacco: Never   Vaping Use    Vaping Use: Never used   Substance and Sexual Activity    Alcohol use: Yes     Alcohol/week: 6.0 standard drinks     Types: 6 Cans of beer per week    Drug use: No    Sexual activity: Yes     Partners: Female         FAMILY HISTORY:  Family History   Problem Relation Age of Onset    Hypertension Other        REVIEW OF SYSTEMS:  Review of Systems   Constitutional:  Negative for activity change.   HENT:  Negative for nosebleeds and trouble swallowing.    Respiratory:  Negative for shortness of breath and wheezing.    Cardiovascular:  Negative for chest pain and palpitations.   Gastrointestinal:  Negative for constipation, diarrhea and nausea.   Genitourinary:  Negative for dysuria and hematuria.   Musculoskeletal:  Negative for arthralgias and myalgias.   Neurological:  Negative for seizures and syncope.   Hematological:  Negative for adenopathy. Does not bruise/bleed easily.   Psychiatric/Behavioral:  Negative for confusion.             Vitals:    08/25/23 1012   BP: 122/79   Pulse: 68   Temp: 97.5 øF (36.4 øC)   TempSrc: Temporal   SpO2: 94%   Weight: 78.5 kg (173 lb 1.6 oz)   Height: 175 cm (68.9\")   PainSc: 0-No pain         8/25/2023    10:16 AM   Current Status   ECOG score 0      PHYSICAL EXAM:        CONSTITUTIONAL:  Vital signs reviewed.  No distress, looks comfortable.  EYES:  Conjunctiva and lids " unremarkable.  PERRLA  EARS,NOSE,MOUTH,THROAT:  Ears and nose appear unremarkable.  Lips, teeth, gums appear unremarkable.  RESPIRATORY:  Normal respiratory effort.  Lungs clear to auscultation bilaterally.  CARDIOVASCULAR:  Normal S1, S2.  No murmurs rubs or gallops.  No significant lower extremity edema.  GASTROINTESTINAL: Abdomen appears unremarkable.  Nontender.  No hepatomegaly.  No splenomegaly.  LYMPHATIC:  No cervical, supraclavicular, axillary lymphadenopathy.  SKIN:  Warm.  No rashes.  PSYCHIATRIC:  Normal judgment and insight.  Normal mood and affect.         RECENT LABS:        WBC   Date Value Ref Range Status   08/25/2023 10.03 3.40 - 10.80 10*3/mm3 Final   07/05/2023 11.93 (H) 3.40 - 10.80 10*3/mm3 Final   04/10/2023 11.59 (H) 3.40 - 10.80 10*3/mm3 Final   08/23/2022 15.89 (H) 4.5 - 11.0 10*3/uL Final   08/22/2022 9.63 4.5 - 11.0 10*3/uL Final   08/08/2022 9.87 4.5 - 11.0 10*3/uL Final   04/26/2022 11.1 (H) 3.4 - 10.8 x10E3/uL Final   10/07/2021 9.68 3.40 - 10.80 10*3/mm3 Final   03/16/2021 9.90 4.5 - 11.0 10*3/uL Final   03/15/2021 8.49 4.5 - 11.0 10*3/uL Final     Hemoglobin   Date Value Ref Range Status   08/25/2023 15.2 13.0 - 17.7 g/dL Final   07/05/2023 15.0 13.0 - 17.7 g/dL Final   04/10/2023 15.5 13.0 - 17.7 g/dL Final   08/23/2022 14.1 13.5 - 17.5 g/dL Final   08/22/2022 15.2 13.5 - 17.5 g/dL Final   08/08/2022 14.2 13.5 - 17.5 g/dL Final   04/26/2022 14.4 13.0 - 17.7 g/dL Final   10/07/2021 16.1 13.0 - 17.7 g/dL Final   03/16/2021 15.0 13.5 - 17.5 g/dL Final   03/15/2021 14.8 13.5 - 17.5 g/dL Final     Platelets   Date Value Ref Range Status   08/25/2023 289 140 - 450 10*3/mm3 Final   07/05/2023 312 140 - 450 10*3/mm3 Final   04/10/2023 313 140 - 450 10*3/mm3 Final   08/23/2022 273 140 - 440 10*3/uL Final   08/22/2022 298 140 - 440 10*3/uL Final   08/08/2022 289 140 - 440 10*3/uL Final   04/26/2022 364 150 - 450 x10E3/uL Final   10/07/2021 337 140 - 450 10*3/mm3 Final   03/16/2021 310 140 -  440 10*3/uL Final   03/15/2021 310 140 - 440 10*3/uL Final       Assessment & Plan   There are no diagnoses linked to this encounter.      Raphael Garcia   *Reactive leukocytosis  April 2022-July 2023: WBC 9.9-15.9  Predominance of mature segmented neutrophils.  Could be due to smoking    *Smokes (despite a history of TIA and carotid endarterectomy)  8/25/2023 advised to stop smoking.  He states he is thinking about this.    *Overweight.  Being overweight can lead to cytopenias through hepatic steatosis.  Body mass index is 25.64 kg/mý.  BMI 25 to <30 is overweight  BMI 30 to <35 is class 1 obesity  BMI 35 to <40 is class 2 obesity  BMI 40 or higher is class 3 obesity   Ideally, lose weight    Plan  No further testing is needed as this leukocytosis appears to be reactive.  I offered follow-up here but he declines which is reasonable.  No follow-up here    My first time meeting him.  All issues new to me.  I reviewed 10 separate CBCs including today's.

## 2023-08-24 DIAGNOSIS — D72.829 LEUKOCYTOSIS, UNSPECIFIED TYPE: Primary | ICD-10-CM

## 2023-08-25 ENCOUNTER — LAB (OUTPATIENT)
Dept: OTHER | Facility: HOSPITAL | Age: 58
End: 2023-08-25
Payer: COMMERCIAL

## 2023-08-25 ENCOUNTER — CONSULT (OUTPATIENT)
Dept: ONCOLOGY | Facility: CLINIC | Age: 58
End: 2023-08-25
Payer: COMMERCIAL

## 2023-08-25 VITALS
DIASTOLIC BLOOD PRESSURE: 79 MMHG | TEMPERATURE: 97.5 F | BODY MASS INDEX: 25.64 KG/M2 | OXYGEN SATURATION: 94 % | SYSTOLIC BLOOD PRESSURE: 122 MMHG | HEIGHT: 69 IN | WEIGHT: 173.1 LBS | HEART RATE: 68 BPM

## 2023-08-25 DIAGNOSIS — D72.829 LEUKOCYTOSIS, UNSPECIFIED TYPE: ICD-10-CM

## 2023-08-25 DIAGNOSIS — D72.829 LEUKOCYTOSIS, UNSPECIFIED TYPE: Primary | ICD-10-CM

## 2023-08-25 LAB
BASOPHILS # BLD AUTO: 0.04 10*3/MM3 (ref 0–0.2)
BASOPHILS NFR BLD AUTO: 0.4 % (ref 0–1.5)
DEPRECATED RDW RBC AUTO: 44.6 FL (ref 37–54)
EOSINOPHIL # BLD AUTO: 0.14 10*3/MM3 (ref 0–0.4)
EOSINOPHIL NFR BLD AUTO: 1.4 % (ref 0.3–6.2)
ERYTHROCYTE [DISTWIDTH] IN BLOOD BY AUTOMATED COUNT: 12.5 % (ref 12.3–15.4)
HCT VFR BLD AUTO: 44.8 % (ref 37.5–51)
HGB BLD-MCNC: 15.2 G/DL (ref 13–17.7)
IMM GRANULOCYTES # BLD AUTO: 0.03 10*3/MM3 (ref 0–0.05)
IMM GRANULOCYTES NFR BLD AUTO: 0.3 % (ref 0–0.5)
LYMPHOCYTES # BLD AUTO: 1.33 10*3/MM3 (ref 0.7–3.1)
LYMPHOCYTES NFR BLD AUTO: 13.3 % (ref 19.6–45.3)
MCH RBC QN AUTO: 32.7 PG (ref 26.6–33)
MCHC RBC AUTO-ENTMCNC: 33.9 G/DL (ref 31.5–35.7)
MCV RBC AUTO: 96.3 FL (ref 79–97)
MONOCYTES # BLD AUTO: 0.83 10*3/MM3 (ref 0.1–0.9)
MONOCYTES NFR BLD AUTO: 8.3 % (ref 5–12)
NEUTROPHILS NFR BLD AUTO: 7.66 10*3/MM3 (ref 1.7–7)
NEUTROPHILS NFR BLD AUTO: 76.3 % (ref 42.7–76)
NRBC BLD AUTO-RTO: 0 /100 WBC (ref 0–0.2)
PLATELET # BLD AUTO: 289 10*3/MM3 (ref 140–450)
PMV BLD AUTO: 9.2 FL (ref 6–12)
RBC # BLD AUTO: 4.65 10*6/MM3 (ref 4.14–5.8)
WBC NRBC COR # BLD: 10.03 10*3/MM3 (ref 3.4–10.8)

## 2023-08-25 PROCEDURE — 36415 COLL VENOUS BLD VENIPUNCTURE: CPT

## 2023-08-25 PROCEDURE — 85025 COMPLETE CBC W/AUTO DIFF WBC: CPT | Performed by: INTERNAL MEDICINE

## 2023-08-25 PROCEDURE — 99204 OFFICE O/P NEW MOD 45 MIN: CPT | Performed by: INTERNAL MEDICINE

## 2023-10-05 ENCOUNTER — OFFICE VISIT (OUTPATIENT)
Dept: FAMILY MEDICINE CLINIC | Facility: CLINIC | Age: 58
End: 2023-10-05
Payer: COMMERCIAL

## 2023-10-05 VITALS
BODY MASS INDEX: 25.62 KG/M2 | RESPIRATION RATE: 16 BRPM | HEIGHT: 69 IN | DIASTOLIC BLOOD PRESSURE: 79 MMHG | OXYGEN SATURATION: 95 % | SYSTOLIC BLOOD PRESSURE: 131 MMHG | TEMPERATURE: 97 F | WEIGHT: 173 LBS | HEART RATE: 69 BPM

## 2023-10-05 DIAGNOSIS — J30.1 SEASONAL ALLERGIC RHINITIS DUE TO POLLEN: ICD-10-CM

## 2023-10-05 DIAGNOSIS — F41.1 GAD (GENERALIZED ANXIETY DISORDER): ICD-10-CM

## 2023-10-05 DIAGNOSIS — I10 ESSENTIAL HYPERTENSION: Primary | ICD-10-CM

## 2023-10-05 DIAGNOSIS — E78.49 OTHER HYPERLIPIDEMIA: ICD-10-CM

## 2023-10-05 DIAGNOSIS — Z72.0 TOBACCO ABUSE: ICD-10-CM

## 2023-10-05 DIAGNOSIS — Z86.73 HISTORY OF CVA (CEREBROVASCULAR ACCIDENT): ICD-10-CM

## 2023-10-05 PROBLEM — F41.9 ANXIETY: Status: RESOLVED | Noted: 2022-10-10 | Resolved: 2023-10-05

## 2023-10-05 PROBLEM — Z87.19 H/O GASTROESOPHAGEAL REFLUX (GERD): Status: RESOLVED | Noted: 2022-10-10 | Resolved: 2023-10-05

## 2023-10-05 PROBLEM — D72.829 LEUKOCYTOSIS: Status: RESOLVED | Noted: 2023-07-05 | Resolved: 2023-10-05

## 2023-10-05 PROBLEM — I77.9 CAROTID ARTERY DISEASE: Status: RESOLVED | Noted: 2022-07-13 | Resolved: 2023-10-05

## 2023-10-05 PROCEDURE — 99214 OFFICE O/P EST MOD 30 MIN: CPT | Performed by: FAMILY MEDICINE

## 2023-10-05 RX ORDER — VARENICLINE TARTRATE 1 MG/1
1 TABLET, FILM COATED ORAL 2 TIMES DAILY
Qty: 56 TABLET | Refills: 4 | Status: SHIPPED | OUTPATIENT
Start: 2023-11-02 | End: 2024-03-21

## 2023-10-05 RX ORDER — CETIRIZINE HYDROCHLORIDE 10 MG/1
10 TABLET ORAL DAILY
Qty: 90 TABLET | Refills: 0 | Status: SHIPPED | OUTPATIENT
Start: 2023-10-05 | End: 2024-01-03

## 2023-10-05 RX ORDER — METOPROLOL SUCCINATE 50 MG/1
50 TABLET, EXTENDED RELEASE ORAL NIGHTLY
Qty: 90 TABLET | Refills: 0 | Status: SHIPPED | OUTPATIENT
Start: 2023-10-05 | End: 2024-01-03

## 2023-10-05 RX ORDER — ALPRAZOLAM 1 MG/1
TABLET ORAL
Qty: 75 TABLET | Refills: 2 | Status: SHIPPED | OUTPATIENT
Start: 2023-10-05

## 2023-10-05 RX ORDER — VARENICLINE TARTRATE 25 MG
KIT ORAL
Qty: 53 EACH | Refills: 0 | Status: SHIPPED | OUTPATIENT
Start: 2023-10-05 | End: 2023-11-02

## 2023-10-05 RX ORDER — ROSUVASTATIN CALCIUM 40 MG/1
40 TABLET, COATED ORAL NIGHTLY
Qty: 90 TABLET | Refills: 0 | Status: SHIPPED | OUTPATIENT
Start: 2023-10-05 | End: 2024-01-03

## 2023-10-05 RX ORDER — IRBESARTAN 300 MG/1
300 TABLET ORAL NIGHTLY
Qty: 90 TABLET | Refills: 0 | Status: SHIPPED | OUTPATIENT
Start: 2023-10-05 | End: 2024-01-03

## 2023-10-05 NOTE — PROGRESS NOTES
"Chief Complaint  Follow-up, Anxiety, Hypertension, and Med Refill (3 month follow up )    Subjective        Anxiety        Hypertension  Associated symptoms include anxiety.    Raphael presents to [facility] for  medication refills. Overall he feels well. Good medication compliance is reported. No medication side effects are reported.  Patient is interested in quitting smoking.  Currently he is smoking 2 cigarettes daily.  Vicente report has been reviewed.  Current medicines are working as intended.          Objective   Vital Signs:   Vitals:    10/05/23 0914   BP: 131/79   BP Location: Left arm   Patient Position: Sitting   Pulse: 69   Resp: 16   Temp: 97 °F (36.1 °C)   TempSrc: Oral   SpO2: 95%   Weight: 78.5 kg (173 lb)   Height: 175.3 cm (69\")                  Physical Exam  Vitals reviewed.   Constitutional:       General: He is not in acute distress.  Eyes:      General: Lids are normal.      Conjunctiva/sclera: Conjunctivae normal.   Neck:      Vascular: No carotid bruit.      Trachea: No tracheal deviation.   Cardiovascular:      Rate and Rhythm: Normal rate and regular rhythm.      Heart sounds: Normal heart sounds. No murmur heard.  Pulmonary:      Effort: Pulmonary effort is normal.      Breath sounds: Normal breath sounds.   Skin:     General: Skin is warm and dry.   Neurological:      Mental Status: He is alert. He is not disoriented.   Psychiatric:         Speech: Speech normal.         Behavior: Behavior normal. Behavior is cooperative.        Result Review :                Assessment and Plan    Diagnoses and all orders for this visit:    1. Essential hypertension (Primary)  Assessment & Plan:  Condition is stable. The current medical regimen is effective;  continue present plan and medications.    Orders:  -     irbesartan (AVAPRO) 300 MG tablet; Take 1 tablet by mouth Every Night for 90 days.  Dispense: 90 tablet; Refill: 0  -     metoprolol succinate XL (Toprol XL) 50 " MG 24 hr tablet; Take 1 tablet by mouth Every Night for 90 days.  Dispense: 90 tablet; Refill: 0  -     Comprehensive Metabolic Panel; Future  -     CBC & Differential; Future  -     TSH; Future    2. JAMEL (generalized anxiety disorder)  Assessment & Plan:  Condition is stable. The current medical regimen is effective;  continue present plan and medications.    Orders:  -     ALPRAZolam (Xanax) 1 MG tablet; Take one tablet BID and may take an additional one half tablet at night prn  Dispense: 75 tablet; Refill: 2    3. Seasonal allergic rhinitis due to pollen  Assessment & Plan:  Condition is stable. The current medical regimen is effective;  continue present plan and medications.    Orders:  -     cetirizine (zyrTEC) 10 MG tablet; Take 1 tablet by mouth Daily for 90 days.  Dispense: 90 tablet; Refill: 0    4. History of CVA (cerebrovascular accident)  Assessment & Plan:  Condition is stable. The current medical regimen is effective;  continue present plan and medications.    Orders:  -     rosuvastatin (CRESTOR) 40 MG tablet; Take 1 tablet by mouth Every Night for 90 days.  Dispense: 90 tablet; Refill: 0    5. Other hyperlipidemia  Assessment & Plan:  The current medical regimen is effective;  continue present plan and medications.        Orders:  -     rosuvastatin (CRESTOR) 40 MG tablet; Take 1 tablet by mouth Every Night for 90 days.  Dispense: 90 tablet; Refill: 0  -     CK; Future  -     Lipid Panel With / Chol / HDL Ratio; Future    6. Tobacco abuse  Assessment & Plan:  Information regarding stop smoking shared in after visit summary.  Start with Chantix starter pack followed by Chantix continuing pack.  Patient knows that he should stop smoking by 90 days.  He has the option to quit smoking sooner than that as well.  Information about Chantix shared in after visit summary.    Orders:  -     varenicline (CHANTIX) 1 MG tablet; Take 1 tablet by mouth 2 (Two) Times a Day for 140 days.  Dispense: 56 tablet;  Refill: 4  -     Varenicline Tartrate, Starter, 0.5 MG X 11 & 1 MG X 42 tablet therapy pack; Take 0.5 mg by mouth Daily for 3 days, THEN 0.5 mg Every 12 (Twelve) Hours for 4 days, THEN 1 mg Every 12 (Twelve) Hours for 21 days.  Dispense: 53 each; Refill: 0        Follow Up   Return in about 3 months (around 1/5/2024) for Adult wellness & medication appt, schedule 30 min.  Patient was given instructions and counseling regarding his condition or for health maintenance advice. Please see specific information pulled into the AVS if appropriate.

## 2023-10-05 NOTE — ASSESSMENT & PLAN NOTE
Information regarding stop smoking shared in after visit summary.  Start with Chantix starter pack followed by Chantix continuing pack.  Patient knows that he should stop smoking by 90 days.  He has the option to quit smoking sooner than that as well.  Information about Chantix shared in after visit summary.

## 2023-10-06 DIAGNOSIS — I10 ESSENTIAL HYPERTENSION: ICD-10-CM

## 2023-10-09 RX ORDER — AMLODIPINE BESYLATE 5 MG/1
5 TABLET ORAL NIGHTLY
Qty: 30 TABLET | Refills: 0 | Status: SHIPPED | OUTPATIENT
Start: 2023-10-09 | End: 2024-01-07

## 2023-10-09 NOTE — TELEPHONE ENCOUNTER
Rx Refill Note  Requested Prescriptions     Pending Prescriptions Disp Refills    amLODIPine (NORVASC) 5 MG tablet 90 tablet 0     Sig: Take 1 tablet by mouth Every Night for 90 days.      Last office visit with prescribing clinician: 10/5/2023   Last telemedicine visit with prescribing clinician: Visit date not found   Next office visit with prescribing clinician: Visit date not found                         Would you like a call back once the refill request has been completed: [] Yes [] No    If the office needs to give you a call back, can they leave a voicemail: [] Yes [] No    Gabi Hsu MA  10/09/23, 13:28 EDT

## 2023-11-17 DIAGNOSIS — I10 ESSENTIAL HYPERTENSION: ICD-10-CM

## 2023-11-17 RX ORDER — AMLODIPINE BESYLATE 5 MG/1
5 TABLET ORAL NIGHTLY
Qty: 14 TABLET | Refills: 0 | Status: SHIPPED | OUTPATIENT
Start: 2023-11-17 | End: 2024-02-15

## 2023-12-08 DIAGNOSIS — I10 ESSENTIAL HYPERTENSION: ICD-10-CM

## 2023-12-08 NOTE — TELEPHONE ENCOUNTER
Rx Refill Note  Requested Prescriptions     Pending Prescriptions Disp Refills    amLODIPine (NORVASC) 5 MG tablet 14 tablet 0     Sig: Take 1 tablet by mouth Every Night for 90 days.      Last office visit with prescribing clinician: 10/5/2023   Last telemedicine visit with prescribing clinician: Visit date not found   Next office visit with prescribing clinician: Visit date not found                         Would you like a call back once the refill request has been completed: [] Yes [] No    If the office needs to give you a call back, can they leave a voicemail: [] Yes [] No    Gabi Hsu MA  12/08/23, 11:22 EST

## 2023-12-10 RX ORDER — AMLODIPINE BESYLATE 5 MG/1
5 TABLET ORAL NIGHTLY
Qty: 90 TABLET | Refills: 0 | Status: SHIPPED | OUTPATIENT
Start: 2023-12-10 | End: 2024-03-09

## 2024-01-02 DIAGNOSIS — I10 ESSENTIAL HYPERTENSION: ICD-10-CM

## 2024-01-02 RX ORDER — METOPROLOL SUCCINATE 50 MG/1
50 TABLET, EXTENDED RELEASE ORAL NIGHTLY
Qty: 30 TABLET | Refills: 0 | Status: SHIPPED | OUTPATIENT
Start: 2024-01-02 | End: 2024-01-08 | Stop reason: SDUPTHER

## 2024-01-02 RX ORDER — IRBESARTAN 300 MG/1
300 TABLET ORAL NIGHTLY
Qty: 30 TABLET | Refills: 0 | Status: SHIPPED | OUTPATIENT
Start: 2024-01-02 | End: 2024-01-08 | Stop reason: SDUPTHER

## 2024-01-03 NOTE — TELEPHONE ENCOUNTER
Gabi,  The patient is due for an appointment.  I sent the prescriptions to the pharmacy on record. Please call the patient and set up an appointment to get additional refills. Please use the refill protocol workflow in the future. Thanks, Dr. Mckay

## 2024-01-08 ENCOUNTER — OFFICE VISIT (OUTPATIENT)
Dept: FAMILY MEDICINE CLINIC | Facility: CLINIC | Age: 59
End: 2024-01-08
Payer: COMMERCIAL

## 2024-01-08 VITALS
DIASTOLIC BLOOD PRESSURE: 72 MMHG | TEMPERATURE: 97.9 F | BODY MASS INDEX: 25.71 KG/M2 | HEIGHT: 69 IN | SYSTOLIC BLOOD PRESSURE: 140 MMHG | WEIGHT: 173.6 LBS | OXYGEN SATURATION: 99 % | HEART RATE: 54 BPM

## 2024-01-08 DIAGNOSIS — I10 ESSENTIAL HYPERTENSION: Primary | ICD-10-CM

## 2024-01-08 DIAGNOSIS — Z51.81 ENCOUNTER FOR THERAPEUTIC DRUG MONITORING: ICD-10-CM

## 2024-01-08 DIAGNOSIS — E78.49 OTHER HYPERLIPIDEMIA: ICD-10-CM

## 2024-01-08 DIAGNOSIS — F41.1 GAD (GENERALIZED ANXIETY DISORDER): ICD-10-CM

## 2024-01-08 DIAGNOSIS — Z86.73 HISTORY OF CVA (CEREBROVASCULAR ACCIDENT): ICD-10-CM

## 2024-01-08 PROCEDURE — 99214 OFFICE O/P EST MOD 30 MIN: CPT | Performed by: FAMILY MEDICINE

## 2024-01-08 RX ORDER — ROSUVASTATIN CALCIUM 40 MG/1
40 TABLET, COATED ORAL NIGHTLY
Qty: 90 TABLET | Refills: 0 | Status: SHIPPED | OUTPATIENT
Start: 2024-01-08 | End: 2024-04-07

## 2024-01-08 RX ORDER — IRBESARTAN 300 MG/1
300 TABLET ORAL NIGHTLY
Qty: 90 TABLET | Refills: 0 | Status: SHIPPED | OUTPATIENT
Start: 2024-01-08 | End: 2024-04-07

## 2024-01-08 RX ORDER — METOPROLOL SUCCINATE 50 MG/1
50 TABLET, EXTENDED RELEASE ORAL NIGHTLY
Qty: 90 TABLET | Refills: 0 | Status: SHIPPED | OUTPATIENT
Start: 2024-01-08 | End: 2024-04-07

## 2024-01-08 RX ORDER — ALPRAZOLAM 1 MG/1
TABLET ORAL
Qty: 75 TABLET | Refills: 2 | Status: SHIPPED | OUTPATIENT
Start: 2024-01-08

## 2024-01-08 RX ORDER — AMLODIPINE BESYLATE 5 MG/1
5 TABLET ORAL NIGHTLY
Qty: 90 TABLET | Refills: 0 | Status: SHIPPED | OUTPATIENT
Start: 2024-01-08 | End: 2024-04-07

## 2024-01-08 NOTE — ASSESSMENT & PLAN NOTE
Anxiety stable.  Continue same medicines.  Tox assure 15 ordered and he will get this either today or tomorrow.  Patient will be due for annual physical exam in 3 months.  He will get labs prior to that visit.  Labs have already been ordered and are on the chart.

## 2024-01-08 NOTE — ASSESSMENT & PLAN NOTE
Blood pressure is above goal.  Patient to restart irbesartan at full dose.  Recheck again in 3 months.

## 2024-01-08 NOTE — PROGRESS NOTES
"Chief Complaint  Hypertension and Hyperlipidemia    Subjective        Hypertension    Hyperlipidemia       Raphael presents to [facility] for medication refills. Overall he feels well. Good medication compliance is reported. No medication side effects are reported.  Vicente report is been reviewed.  Blood pressure is above goal.  Patient went out of he is Irbesartan.  He was taking half dose over the past 3 to 4 days.          Objective   Vital Signs:   Vitals:    01/08/24 0803   BP: 140/72   Pulse: 54   Temp: 97.9 °F (36.6 °C)   TempSrc: Oral   SpO2: 99%   Weight: 78.7 kg (173 lb 9.6 oz)   Height: 175.3 cm (69\")   PainSc: 0-No pain                      Physical Exam  Vitals reviewed.   Constitutional:       General: He is not in acute distress.  Eyes:      General: Lids are normal.      Conjunctiva/sclera: Conjunctivae normal.   Neck:      Vascular: No carotid bruit.      Trachea: No tracheal deviation.   Cardiovascular:      Rate and Rhythm: Normal rate and regular rhythm.      Heart sounds: Normal heart sounds. No murmur heard.  Pulmonary:      Effort: Pulmonary effort is normal.      Breath sounds: Normal breath sounds.   Skin:     General: Skin is warm and dry.   Neurological:      Mental Status: He is alert. He is not disoriented.   Psychiatric:         Speech: Speech normal.         Behavior: Behavior normal. Behavior is cooperative.          Result Review :                Assessment and Plan    Assessment & Plan  Essential hypertension  Blood pressure is above goal.  Patient to restart irbesartan at full dose.  Recheck again in 3 months.  JAMEL (generalized anxiety disorder)  Anxiety stable.  Continue same medicines.  Tox assure 15 ordered and he will get this either today or tomorrow.  Patient will be due for annual physical exam in 3 months.  He will get labs prior to that visit.  Labs have already been ordered and are on the chart.  History of CVA (cerebrovascular " accident)  Condition is stable. The current medical regimen is effective;  continue present plan and medications.  Other hyperlipidemia  Condition is stable. The current medical regimen is effective;  continue present plan and medications.  Encounter for therapeutic drug monitoring  Tox assure 15 ordered.           Follow Up   Return in about 3 months (around 4/8/2024) for Adult wellness & medication appt, schedule 30 min.  Patient was given instructions and counseling regarding his condition or for health maintenance advice. Please see specific information pulled into the AVS if appropriate.

## 2024-02-20 DIAGNOSIS — J30.1 SEASONAL ALLERGIC RHINITIS DUE TO POLLEN: ICD-10-CM

## 2024-02-20 RX ORDER — CETIRIZINE HYDROCHLORIDE 10 MG/1
10 TABLET ORAL DAILY
Qty: 90 TABLET | Refills: 0 | Status: SHIPPED | OUTPATIENT
Start: 2024-02-20 | End: 2024-05-20

## 2024-03-06 ENCOUNTER — OFFICE VISIT (OUTPATIENT)
Dept: FAMILY MEDICINE CLINIC | Facility: CLINIC | Age: 59
End: 2024-03-06
Payer: COMMERCIAL

## 2024-03-06 VITALS
WEIGHT: 177 LBS | DIASTOLIC BLOOD PRESSURE: 86 MMHG | OXYGEN SATURATION: 96 % | BODY MASS INDEX: 26.22 KG/M2 | HEIGHT: 69 IN | HEART RATE: 78 BPM | SYSTOLIC BLOOD PRESSURE: 148 MMHG

## 2024-03-06 DIAGNOSIS — I10 ESSENTIAL HYPERTENSION: ICD-10-CM

## 2024-03-06 DIAGNOSIS — F41.1 GAD (GENERALIZED ANXIETY DISORDER): ICD-10-CM

## 2024-03-06 DIAGNOSIS — Z86.73 HISTORY OF CVA (CEREBROVASCULAR ACCIDENT): ICD-10-CM

## 2024-03-06 DIAGNOSIS — E78.49 OTHER HYPERLIPIDEMIA: ICD-10-CM

## 2024-03-06 PROCEDURE — 99214 OFFICE O/P EST MOD 30 MIN: CPT | Performed by: FAMILY MEDICINE

## 2024-03-06 RX ORDER — METOPROLOL SUCCINATE 50 MG/1
50 TABLET, EXTENDED RELEASE ORAL NIGHTLY
Qty: 90 TABLET | Refills: 0 | Status: SHIPPED | OUTPATIENT
Start: 2024-03-06 | End: 2024-06-04

## 2024-03-06 RX ORDER — ALPRAZOLAM 1 MG/1
TABLET ORAL
Qty: 75 TABLET | Refills: 2 | Status: SHIPPED | OUTPATIENT
Start: 2024-03-06

## 2024-03-06 RX ORDER — ROSUVASTATIN CALCIUM 40 MG/1
40 TABLET, COATED ORAL NIGHTLY
Qty: 90 TABLET | Refills: 0 | Status: SHIPPED | OUTPATIENT
Start: 2024-03-06 | End: 2024-06-04

## 2024-03-06 RX ORDER — ACETAMINOPHEN 500 MG
1000 TABLET ORAL 3 TIMES DAILY PRN
COMMUNITY
Start: 2024-03-06 | End: 2025-03-06

## 2024-03-06 RX ORDER — IRBESARTAN 300 MG/1
300 TABLET ORAL NIGHTLY
Qty: 90 TABLET | Refills: 0 | Status: SHIPPED | OUTPATIENT
Start: 2024-03-06 | End: 2024-06-04

## 2024-03-06 RX ORDER — AMLODIPINE BESYLATE 5 MG/1
5 TABLET ORAL NIGHTLY
Qty: 90 TABLET | Refills: 0 | Status: SHIPPED | OUTPATIENT
Start: 2024-03-06 | End: 2024-06-04

## 2024-03-06 NOTE — PROGRESS NOTES
"Chief Complaint  Follow-up (3 month) and Med Refill    Subjective        HPI   History of Present Illness  The patient is a 58-year-old male who presents for a routine 3-month visit.    His blood pressure was slightly elevated today. His home blood pressures have been in the 124 to 128 range. He denies any headaches or dizziness. He is on amlodipine, irbesartan, and metoprolol.    He is taking alprazolam twice a day. He denies any side effects. It is controlling his anxiousness and keeps him steady. He does not wake up tired the next morning. He does not sleep well unless his grandson keeps him up all night.    He is taking Crestor. He takes a B complex with it. His vascular surgeon started him on B12. He takes a baby aspirin in the morning.    He takes ibuprofen as needed. He takes it about 4 times a day. He does not feel it works as well as Tylenol.     Vicente reviewed        Vital Signs:   Vitals:    03/06/24 1028   BP: 148/86   Pulse: 78   SpO2: 96%   Weight: 80.3 kg (177 lb)   Height: 175 cm (68.9\")            3/6/2024    10:32 AM   PHQ-2/PHQ-9 Depression Screening   Little Interest or Pleasure in Doing Things 0-->not at all   Feeling Down, Depressed or Hopeless 0-->not at all   PHQ-9: Brief Depression Severity Measure Score 0                 Physical Exam  Vitals reviewed.   Constitutional:       General: He is not in acute distress.  Eyes:      General: Lids are normal.      Conjunctiva/sclera: Conjunctivae normal.   Neck:      Vascular: No carotid bruit.      Trachea: No tracheal deviation.   Cardiovascular:      Rate and Rhythm: Normal rate and regular rhythm.      Heart sounds: Normal heart sounds. No murmur heard.  Pulmonary:      Effort: Pulmonary effort is normal.      Breath sounds: Normal breath sounds.   Skin:     General: Skin is warm and dry.   Neurological:      Mental Status: He is alert. He is not disoriented.   Psychiatric:         Speech: Speech normal.         Behavior: Behavior normal. " Behavior is cooperative.                      Assessment and Plan           Assessment & Plan  1. Essential hypertension.  It is a little bit above goal today. We will stop the ibuprofen. We will continue the same medicines.    2. Hyperlipidemia.  We will continue with our current medicines. I will get the labs done within the next week or so.    3. Anxiety disorder.  We will continue with our alprazolam. ToxAssure urine drug screen test is due and we will get that done in the next week or so.    4. Tobacco abuse disorder.  We are doing good on that. We will continue with Chantix and hopefully by 3 months, he will be off the cigarettes totally. If his breathing does not get better after coming off the cigarettes, we will do testing before prescribing an inhaler.    Follow-up  The patient will follow up in 3 months.         Patient was given instructions and counseling regarding his condition or for health maintenance advice. Please see specific information pulled into the AVS if appropriate.     Patient or patient representative verbalized consent for the use of Ambient Listening during the visit with  Doroteo Mckay MD for chart documentation. 3/6/2024  11:01 EST

## 2024-03-17 LAB
1OH-MIDAZOLAM UR QL SCN: NOT DETECTED NG/MG CREAT
6MAM UR QL SCN: NEGATIVE NG/ML
7AMINOCLONAZEPAM/CREAT UR: NOT DETECTED NG/MG CREAT
A-OH ALPRAZ/CREAT UR: 90 NG/MG CREAT
A-OH-TRIAZOLAM/CREAT UR CFM: NOT DETECTED NG/MG CREAT
ALPRAZ/CREAT UR CFM: 174 NG/MG CREAT
AMPHETAMINES UR QL SCN: NEGATIVE NG/ML
BARBITURATES UR QL SCN: NEGATIVE NG/ML
BENZODIAZ SCN METH UR: NORMAL
BUPRENORPHINE UR QL SCN: NEGATIVE
BUPRENORPHINE/CREAT UR: NOT DETECTED NG/MG CREAT
CANNABINOIDS UR QL SCN: NEGATIVE NG/ML
CLONAZEPAM/CREAT UR CFM: NOT DETECTED NG/MG CREAT
COCAINE+BZE UR QL SCN: NEGATIVE NG/ML
CREAT UR-MCNC: 70 MG/DL
DESALKYLFLURAZ/CREAT UR: NOT DETECTED NG/MG CREAT
DIAZEPAM/CREAT UR: NOT DETECTED NG/MG CREAT
ETHANOL UR QL SCN: NEGATIVE G/DL
FENTANYL CTO UR SCN-MCNC: NEGATIVE NG/ML
FENTANYL/CREAT UR: NOT DETECTED NG/MG CREAT
FLUNITRAZEPAM UR QL SCN: NOT DETECTED NG/MG CREAT
LORAZEPAM/CREAT UR: NOT DETECTED NG/MG CREAT
METHADONE UR QL SCN: NEGATIVE NG/ML
METHADONE+METAB UR QL SCN: NEGATIVE NG/ML
MIDAZOLAM/CREAT UR CFM: NOT DETECTED NG/MG CREAT
NORBUPRENORPHINE/CREAT UR: NOT DETECTED NG/MG CREAT
NORDIAZEPAM/CREAT UR: NOT DETECTED NG/MG CREAT
NORFENTANYL/CREAT UR: NOT DETECTED NG/MG CREAT
NORFLUNITRAZEPAM UR-MCNC: NOT DETECTED NG/MG CREAT
OPIATES UR SCN-MCNC: NEGATIVE NG/ML
OXAZEPAM/CREAT UR: NOT DETECTED NG/MG CREAT
OXYCODONE CTO UR SCN-MCNC: NEGATIVE NG/ML
PCP UR QL SCN: NEGATIVE NG/ML
PRESCRIBED MEDICATIONS: NORMAL
TAPENTADOL CTO UR SCN-MCNC: NEGATIVE NG/ML
TEMAZEPAM/CREAT UR: NOT DETECTED NG/MG CREAT
TRAMADOL UR QL SCN: NEGATIVE NG/ML

## 2024-03-20 DIAGNOSIS — R79.89 ELEVATED SERUM CREATININE: Primary | ICD-10-CM

## 2024-04-10 NOTE — TELEPHONE ENCOUNTER
Rx Refill Note  Requested Prescriptions      No prescriptions requested or ordered in this encounter      Last office visit with prescribing clinician: 10/5/2023   Last telemedicine visit with prescribing clinician: Visit date not found   Next office visit with prescribing clinician: Visit date not found                         Would you like a call back once the refill request has been completed: [] Yes [] No    If the office needs to give you a call back, can they leave a voicemail: [] Yes [] No    Gabi Hsu MA  01/02/24, 16:49 EST     Subjective  Tanya Zabala, 54 y.o. female presents today with:  Chief Complaint   Patient presents with    1 Year Follow Up     Patient presents today for a yearly check up.       HPI  Patient is here for follow-up on hypertension denies chest pain pressure shortness of breath and has no concerns  Obesity - has gained 9#s   Review of Systems   All other systems reviewed and are negative.        Past Medical History:   Diagnosis Date    Bilateral primary osteoarthritis of knee 2021    Hypertension     Meniere disease     left ear    Postmenopausal     Vertigo     Zoster      Past Surgical History:   Procedure Laterality Date    BREAST BIOPSY Left 2016    Patient states B9     SECTION      COLONOSCOPY N/A 2019    COLONOSCOPY performed by Nikita Whatley MD at [facility]     Social History     Socioeconomic History    Marital status:      Spouse name: Not on file    Number of children: Not on file    Years of education: Not on file    Highest education level: Not on file   Occupational History    Not on file   Tobacco Use    Smoking status: Never    Smokeless tobacco: Never   Substance and Sexual Activity    Alcohol use: No    Drug use: No    Sexual activity: Yes     Partners: Male   Other Topics Concern    Not on file   Social History Narrative    Not on file     Social Determinants of Health     Financial Resource Strain: Low Risk  (4/10/2024)    Overall Financial Resource Strain (CARDIA)     Difficulty of Paying Living Expenses: Not very hard   Food Insecurity: No Food Insecurity (4/10/2024)    Hunger Vital Sign     Worried About Running Out of Food in the Last Year: Never true     Ran Out of Food in the Last Year: Never true   Transportation Needs: Unknown (4/10/2024)    PRAPARE - Transportation     Lack of Transportation (Medical): Not on file     Lack of Transportation (Non-Medical): No   Physical Activity: Not on file   Stress: Not on file   Social

## 2024-06-03 DIAGNOSIS — F41.1 GAD (GENERALIZED ANXIETY DISORDER): ICD-10-CM

## 2024-06-03 RX ORDER — ALPRAZOLAM 1 MG/1
TABLET ORAL
Qty: 15 TABLET | Refills: 0 | Status: SHIPPED | OUTPATIENT
Start: 2024-06-03

## 2024-06-03 NOTE — TELEPHONE ENCOUNTER
Caller: Raphael Garcia    Relationship: Self    Best call back number: [phone]     Requested Prescriptions:   Requested Prescriptions     Pending Prescriptions Disp Refills    ALPRAZolam (Xanax) 1 MG tablet 75 tablet 2     Sig: Take one tablet BID and may take an additional one half tablet at night prn        Pharmacy where request should be sent: [facility] [phone] [facility] [address] [facility] [phone] [facility] [phone] FX     Last office visit with prescribing clinician: 3/6/2024   Last telemedicine visit with prescribing clinician: Visit date not found   Next office visit with prescribing clinician: 6/12/2024     Additional details provided by patient: PATIENT HAS A 4 DAY SUPPLY OF MEDICATION, HE WOULD LIKE TO GET A REFILL TO LAST UNTIL HIS NEXT APPOINTMENT     Does the patient have less than a 3 day supply:  [] Yes  [x] No    Would you like a call back once the refill request has been completed: [x] Yes [] No    If the office needs to give you a call back, can they leave a voicemail: [x] Yes [] No    Drea Kapadia Rep   06/03/24 11:32 EDT

## 2024-06-03 NOTE — TELEPHONE ENCOUNTER
Last visit: 3-6-24  Next visit: 6-12-24    Pt states he only has 4 day supply left, would like rx until appt

## 2024-06-12 ENCOUNTER — OFFICE VISIT (OUTPATIENT)
Dept: FAMILY MEDICINE CLINIC | Facility: CLINIC | Age: 59
End: 2024-06-12
Payer: COMMERCIAL

## 2024-06-12 VITALS
WEIGHT: 173 LBS | SYSTOLIC BLOOD PRESSURE: 132 MMHG | DIASTOLIC BLOOD PRESSURE: 74 MMHG | OXYGEN SATURATION: 98 % | BODY MASS INDEX: 25.62 KG/M2 | HEART RATE: 68 BPM | HEIGHT: 69 IN

## 2024-06-12 DIAGNOSIS — Z86.73 HISTORY OF CVA (CEREBROVASCULAR ACCIDENT): ICD-10-CM

## 2024-06-12 DIAGNOSIS — I10 ESSENTIAL HYPERTENSION: ICD-10-CM

## 2024-06-12 DIAGNOSIS — F41.1 GAD (GENERALIZED ANXIETY DISORDER): ICD-10-CM

## 2024-06-12 DIAGNOSIS — E78.49 OTHER HYPERLIPIDEMIA: ICD-10-CM

## 2024-06-12 PROCEDURE — 99214 OFFICE O/P EST MOD 30 MIN: CPT | Performed by: FAMILY MEDICINE

## 2024-06-12 RX ORDER — CETIRIZINE HYDROCHLORIDE 10 MG/1
10 TABLET ORAL DAILY
Qty: 90 TABLET | Refills: 0 | Status: CANCELLED | OUTPATIENT
Start: 2024-06-12 | End: 2024-09-10

## 2024-06-12 RX ORDER — METOPROLOL SUCCINATE 50 MG/1
50 TABLET, EXTENDED RELEASE ORAL NIGHTLY
Qty: 90 TABLET | Refills: 0 | Status: SHIPPED | OUTPATIENT
Start: 2024-06-12 | End: 2024-09-10

## 2024-06-12 RX ORDER — ROSUVASTATIN CALCIUM 40 MG/1
40 TABLET, COATED ORAL NIGHTLY
Qty: 90 TABLET | Refills: 0 | Status: SHIPPED | OUTPATIENT
Start: 2024-06-12 | End: 2024-09-10

## 2024-06-12 RX ORDER — ACETAMINOPHEN 500 MG
1000 TABLET ORAL 3 TIMES DAILY PRN
Status: CANCELLED | COMMUNITY
Start: 2024-06-12 | End: 2025-06-12

## 2024-06-12 RX ORDER — IRBESARTAN 300 MG/1
300 TABLET ORAL NIGHTLY
Qty: 90 TABLET | Refills: 0 | Status: SHIPPED | OUTPATIENT
Start: 2024-06-12 | End: 2024-09-10

## 2024-06-12 RX ORDER — ALPRAZOLAM 1 MG/1
TABLET ORAL
Qty: 65 TABLET | Refills: 2 | Status: SHIPPED | OUTPATIENT
Start: 2024-06-12

## 2024-06-12 NOTE — PROGRESS NOTES
"Chief Complaint  Follow-up (3 month), Hyperlipidemia, Hypertension, Anxiety, and Nicotine Dependence    Subjective        Hyperlipidemia    Hypertension  Associated symptoms include anxiety.   Anxiety    Nicotine Dependence       History of Present Illness  The patient is a 59-year-old male who presents for follow-up of multiple medical concerns.    The patient's blood pressure has shown improvement since his last visit in 03/2024. He has discontinued the use of ibuprofen.    The patient has undergone laboratory tests, expressing a desire to repeat the tests due to a perceived abnormal results. He attributes the abnormal results to the consumption of Muscle Milk.    The patient requires a refill of his Xanax prescription. He typically takes one tablet in the morning, one in the evening, and half as needed, with a total of 65 tablets dispensed each month.    Supplemental Information  He is not on Chantix anymore as he had some crazy dreams on it. He is still smoking.          Vital Signs:   Vitals:    06/12/24 1551   BP: 132/74   Pulse: 68   SpO2: 98%   Weight: 78.5 kg (173 lb)   Height: 175.3 cm (69\")            3/6/2024    10:32 AM   PHQ-2/PHQ-9 Depression Screening   Little Interest or Pleasure in Doing Things 0-->not at all   Feeling Down, Depressed or Hopeless 0-->not at all   PHQ-9: Brief Depression Severity Measure Score 0                 Physical Exam  Vitals reviewed.   Constitutional:       General: He is not in acute distress.  Eyes:      General: Lids are normal.      Conjunctiva/sclera: Conjunctivae normal.   Neck:      Vascular: No carotid bruit.      Trachea: No tracheal deviation.   Cardiovascular:      Rate and Rhythm: Normal rate and regular rhythm.      Heart sounds: Normal heart sounds. No murmur heard.  Pulmonary:      Effort: Pulmonary effort is normal.      Breath sounds: Normal breath sounds.   Skin:     General: Skin is warm and dry.   Neurological:      General: No focal deficit present.     "  Mental Status: He is alert and oriented to person, place, and time. He is not disoriented.   Psychiatric:         Attention and Perception: Attention normal. He is attentive.         Mood and Affect: Mood normal. Mood is not anxious.         Speech: Speech normal.         Behavior: Behavior normal. Behavior is cooperative.         Thought Content: Thought content normal.         Judgment: Judgment normal.       Physical Exam           The following data was reviewed by: Doroteo Mckay MD on 06/12/2024:  TSH (03/14/2024 08:11)  Lipid Panel With / Chol / HDL Ratio (03/14/2024 08:11)  CK (03/14/2024 08:11)  CBC & Differential (03/14/2024 08:11)  Comprehensive Metabolic Panel (03/14/2024 08:11)  ToxAssure Flex 15, Ur - (03/14/2024 08:09)    Results  Laboratory Studies  White blood cell count 9.15. Hemoglobin 15.1. Blood sugar 84. Creatinine 2.3. Total cholesterol 106. LDL cholesterol 45. Thyroid function normal.    Testing  Drug screens show positive for benzodiazepines.                Assessment and Plan           Assessment & Plan  1. Essential hypertension.  The patient's condition has shown improvement since discontinuing ibuprofen. The patient is advised to persist with the current medication regimen.    2. Hyperlipidemia.  The condition is currently well-managed. The target LDL cholesterol is set to be less than 70 and optimally less than 55, considering the patient's past history of CVA. The patient is to continue with the current medication regimen.    3. Generalized anxiety disorder.  The condition is well-managed. The JADEN report has been thoroughly reviewed. The patient is advised to continue with the current dose of alprazolam, with 65 tablets dispensed with 2 additional refills.    Follow-up  The patient is scheduled for a follow-up visit in the office in 3 months, or sooner if any symptoms or issues emerge.         Patient was given instructions and counseling regarding his condition or for health  maintenance advice. Please see specific information pulled into the AVS if appropriate.     Patient or patient representative verbalized consent for the use of Ambient Listening during the visit with  Doroteo Mckay MD for chart documentation. 6/12/2024  16:25 EDT

## 2024-07-05 ENCOUNTER — PRIOR AUTHORIZATION (OUTPATIENT)
Dept: FAMILY MEDICINE CLINIC | Facility: CLINIC | Age: 59
End: 2024-07-05
Payer: COMMERCIAL

## 2024-07-05 NOTE — TELEPHONE ENCOUNTER
Irbesartan 300MG tablets    Key: [id number]      Sent to Plan today, the plan will fax you a determination, typically within 1 to 5 business days.

## 2024-07-08 ENCOUNTER — TELEPHONE (OUTPATIENT)
Dept: FAMILY MEDICINE CLINIC | Facility: CLINIC | Age: 59
End: 2024-07-08
Payer: COMMERCIAL

## 2024-07-08 NOTE — TELEPHONE ENCOUNTER
Received call from [facility] stating PT is presenting same day symptoms. Advised PT that we don't have any availability today for Dr. Mckay or any other provider in office. Advised PT to be seen with nearest urgent care or ER.

## 2024-08-06 DIAGNOSIS — J30.1 SEASONAL ALLERGIC RHINITIS DUE TO POLLEN: ICD-10-CM

## 2024-08-06 RX ORDER — CETIRIZINE HYDROCHLORIDE 10 MG/1
10 TABLET ORAL DAILY
Qty: 90 TABLET | Refills: 1 | Status: SHIPPED | OUTPATIENT
Start: 2024-08-06

## 2024-08-29 ENCOUNTER — OFFICE VISIT (OUTPATIENT)
Dept: FAMILY MEDICINE CLINIC | Facility: CLINIC | Age: 59
End: 2024-08-29
Payer: COMMERCIAL

## 2024-08-29 VITALS
BODY MASS INDEX: 25.18 KG/M2 | OXYGEN SATURATION: 97 % | HEIGHT: 69 IN | WEIGHT: 170 LBS | DIASTOLIC BLOOD PRESSURE: 80 MMHG | HEART RATE: 64 BPM | SYSTOLIC BLOOD PRESSURE: 130 MMHG

## 2024-08-29 DIAGNOSIS — Z86.73 HISTORY OF CVA (CEREBROVASCULAR ACCIDENT): ICD-10-CM

## 2024-08-29 DIAGNOSIS — F41.1 GAD (GENERALIZED ANXIETY DISORDER): ICD-10-CM

## 2024-08-29 DIAGNOSIS — E78.49 OTHER HYPERLIPIDEMIA: ICD-10-CM

## 2024-08-29 DIAGNOSIS — I10 ESSENTIAL HYPERTENSION: Primary | ICD-10-CM

## 2024-08-29 PROCEDURE — 99214 OFFICE O/P EST MOD 30 MIN: CPT | Performed by: FAMILY MEDICINE

## 2024-08-29 RX ORDER — METOPROLOL SUCCINATE 50 MG/1
50 TABLET, EXTENDED RELEASE ORAL NIGHTLY
Qty: 90 TABLET | Refills: 2 | Status: SHIPPED | OUTPATIENT
Start: 2024-08-29 | End: 2025-05-26

## 2024-08-29 RX ORDER — ALPRAZOLAM 1 MG
TABLET ORAL
Qty: 65 TABLET | Refills: 2 | Status: SHIPPED | OUTPATIENT
Start: 2024-08-29

## 2024-08-29 RX ORDER — IRBESARTAN 300 MG/1
300 TABLET ORAL NIGHTLY
Qty: 90 TABLET | Refills: 2 | Status: SHIPPED | OUTPATIENT
Start: 2024-08-29 | End: 2025-05-26

## 2024-08-29 RX ORDER — ROSUVASTATIN CALCIUM 40 MG/1
40 TABLET, COATED ORAL NIGHTLY
Qty: 90 TABLET | Refills: 2 | Status: SHIPPED | OUTPATIENT
Start: 2024-08-29 | End: 2025-05-26

## 2024-08-29 NOTE — PROGRESS NOTES
"Chief Complaint  Follow-up, Med Refill, Hyperlipidemia, Hypertension, Anxiety, and Tobacco abuse disorder    Subjective        HPI      The patient presents for a routine visit.    He reports feeling well overall. His current medications include metoprolol and irbesartan for blood pressure management, alprazolam for anxiety, which he takes twice daily and an additional half dose at night as needed, and rosuvastatin. He confirms that these medications are effective in managing his conditions.           Vital Signs:   Vitals:    08/29/24 1041   BP: 130/80   Pulse: 64   SpO2: 97%   Weight: 77.1 kg (170 lb)   Height: 175.3 cm (69\")            3/6/2024    10:32 AM   PHQ-2/PHQ-9 Depression Screening   Little Interest or Pleasure in Doing Things 0-->not at all   Feeling Down, Depressed or Hopeless 0-->not at all   PHQ-9: Brief Depression Severity Measure Score 0       BMI is >= 25 and <30. (Overweight) The following options were offered after discussion;: exercise counseling/recommendations        Physical Exam  Vitals reviewed.   Constitutional:       General: He is not in acute distress.  Eyes:      General: Lids are normal.      Conjunctiva/sclera: Conjunctivae normal.   Neck:      Vascular: No carotid bruit.      Trachea: No tracheal deviation.   Cardiovascular:      Rate and Rhythm: Normal rate and regular rhythm.      Heart sounds: Normal heart sounds. No murmur heard.  Pulmonary:      Effort: Pulmonary effort is normal.      Breath sounds: Normal breath sounds.   Skin:     General: Skin is warm and dry.   Neurological:      Mental Status: He is alert. He is not disoriented.   Psychiatric:         Speech: Speech normal.         Behavior: Behavior normal. Behavior is cooperative.          Vital Signs  Blood pressure reading is 130/80.             Results  Laboratory Studies  Kidney function was off (specific values not provided).                Assessment and Plan              1. Hypertension.  His blood pressure is " well-regulated at 130/80. He is currently on metoprolol and irbesartan. Prescriptions for these medications have been renewed and sent to [facility].    2. Anxiety.  He reports satisfactory control of his anxiety with alprazolam, taken as two doses per day, half a dose at night as needed. The current prescription of 65 tablets at a time is working well. The prescription has been renewed and sent to [facility] pharmacy. Vicente reviewed    3. Hyperlipidemia.  He is on rosuvastatin for cholesterol management. The prescription has been renewed and sent to [facility] pharmacy.    4. Abnormal kidney function.  His kidney function was slightly abnormal during the last evaluation in March 2024. A BUN and creatinine test will be conducted today to monitor his kidney function.    Follow-up  A follow-up appointment is scheduled in 3 months.     No follow-ups on file.     Patient was given instructions and counseling regarding his condition or for health maintenance advice. Please see specific information pulled into the AVS if appropriate.     Patient or patient representative verbalized consent for the use of Ambient Listening during the visit with  Doroteo Mckay MD for chart documentation. 8/29/2024  11:20 EDT

## 2024-10-03 ENCOUNTER — TELEPHONE (OUTPATIENT)
Dept: FAMILY MEDICINE CLINIC | Facility: CLINIC | Age: 59
End: 2024-10-03
Payer: COMMERCIAL

## 2024-10-03 DIAGNOSIS — I10 ESSENTIAL HYPERTENSION: ICD-10-CM

## 2024-10-03 RX ORDER — AMLODIPINE BESYLATE 5 MG/1
5 TABLET ORAL NIGHTLY
Qty: 90 TABLET | Refills: 2 | Status: SHIPPED | OUTPATIENT
Start: 2024-10-03 | End: 2025-06-30

## 2024-10-03 NOTE — TELEPHONE ENCOUNTER
Caller: Alicja Garcia    Relationship: Emergency Contact    Best call back number: [phone]     Requested Prescriptions:   AMLODIPINE 5 MG       Pharmacy where request should be sent: [facility] [id number] [facility] [address] [facility] [address] [facility] [phone] [facility] [phone] FX     Last office visit with prescribing clinician: 8/29/2024   Last telemedicine visit with prescribing clinician: Visit date not found   Next office visit with prescribing clinician: Visit date not found     Additional details provided by patient: PATIENTS WIFE STATES THAT HE IS OUT OF THIS MEDICATION.,    Does the patient have less than a 3 day supply:  [x] Yes  [] No    Would you like a call back once the refill request has been completed: [] Yes [x] No    If the office needs to give you a call back, can they leave a voicemail: [] Yes [x] No    Drea Aragon Rep   10/03/24 14:29 EDT

## 2024-10-03 NOTE — TELEPHONE ENCOUNTER
Patient called for refill of amlodipine.  Apparently that medication dropped off active medication list.  We sent in supply of amlodipine to his pharmacy on record.  Patient will keep follow-up as scheduled in November.  He will get labs in mid November prior to the visit.

## 2024-10-07 NOTE — TELEPHONE ENCOUNTER
PER PATIENT PROVIDER   I called patient and refilled amlodipine.    I also advised him to get his labs done in   mid November prior to his next visit.

## 2024-12-09 ENCOUNTER — TELEPHONE (OUTPATIENT)
Dept: FAMILY MEDICINE CLINIC | Facility: CLINIC | Age: 59
End: 2024-12-09
Payer: COMMERCIAL

## 2024-12-09 DIAGNOSIS — F41.1 GAD (GENERALIZED ANXIETY DISORDER): Primary | ICD-10-CM

## 2024-12-09 RX ORDER — ALPRAZOLAM 1 MG/1
TABLET ORAL
Qty: 10 TABLET | Refills: 0 | Status: SHIPPED | OUTPATIENT
Start: 2024-12-09 | End: 2024-12-10 | Stop reason: SDUPTHER

## 2024-12-09 NOTE — TELEPHONE ENCOUNTER
Patient came in for his appointment that he thought was for today.  I looked it up and it is actually for 1/09/25.  Patient is needing his zanax refilled.  Dr. Mckay's next available appointment isn't until 12/18.  It looks like Laura Ayala from the [facility] scheduled his appointment.  I told patient that I would send a high priority message to Dr. Mckay and his MA to possibly get this refilled for him.

## 2024-12-09 NOTE — TELEPHONE ENCOUNTER
Called pt left vm stating emergency supply of meds sent to pharm, still needs appt tomorrow for full RX.

## 2024-12-10 ENCOUNTER — OFFICE VISIT (OUTPATIENT)
Dept: FAMILY MEDICINE CLINIC | Facility: CLINIC | Age: 59
End: 2024-12-10
Payer: COMMERCIAL

## 2024-12-10 VITALS
SYSTOLIC BLOOD PRESSURE: 132 MMHG | HEIGHT: 69 IN | DIASTOLIC BLOOD PRESSURE: 72 MMHG | WEIGHT: 176.8 LBS | BODY MASS INDEX: 26.19 KG/M2 | OXYGEN SATURATION: 93 % | HEART RATE: 71 BPM | TEMPERATURE: 97.8 F

## 2024-12-10 DIAGNOSIS — F41.1 GAD (GENERALIZED ANXIETY DISORDER): Primary | ICD-10-CM

## 2024-12-10 DIAGNOSIS — Z79.899 CONTROLLED SUBSTANCE AGREEMENT SIGNED: ICD-10-CM

## 2024-12-10 PROCEDURE — 99213 OFFICE O/P EST LOW 20 MIN: CPT

## 2024-12-10 RX ORDER — ALPRAZOLAM 1 MG/1
TABLET ORAL
Qty: 75 TABLET | Refills: 0 | Status: SHIPPED | OUTPATIENT
Start: 2024-12-10

## 2024-12-10 NOTE — LETTER
Controlled Substance Prescribing Agreement          I, Raphael Garcia [PATIENT],  1965 [] a patient of  Dilcia Hugo APRN   [PROVIDER] at [facility] [PRACTICE], have been informed that  individuals who are prescribed certain Controlled Substances including, but not limited to, narcotic pain medicines, stimulants, benzodiazepine tranquilizers, and barbiturate sedatives, can abuse those substances or may allow abuse by others, and have some risk of developing an addictive disorder or suffering a relapse of a prior addiction. Therefore, I have been informed that it is necessary to observe strict rules pertaining to their use, and I agree to follow the terms and procedures described in this Agreement as consideration for, and as a condition of, the willingness of the physician whose signature appears below to consider prescribing or to continue prescribing Controlled Substances to treat my pain.     1. I will inform my physician of any current or past substance abuse, or any current or past substance abuse of any immediate member of my immediate family.     2. I agree that I may be subject to a voluntary evaluation by psychologists and/or psychiatrists, possibly at my own expense, before any Controlled Substances will be prescribed to me. I agree that the need to be evaluated by psychologists and/or psychiatrists may be revisited every three (3) to six (6) months thereafter while taking the medication.     3. All Controlled Substances must come from a provider in the PROVIDER’S PRACTICE. My Controlled Substances will come from the PROVIDER whose signature appears below, or during his or her absence, by the covering provider, unless specific written authorization is obtained from the office for an exception.     4. I will obtain all Controlled Substances from the same pharmacy. Should the need arise to change pharmacies, I will inform the PROVIDER’S office.     5. I  will inform the PROVIDER’S office of any new medications or medical conditions, and of any adverse effects I experience from any of the medications that I take.     6. I will inform my other health care providers that I am taking the Controlled Substances listed above, and of the existence of this Agreement. In the event of an emergency, I will provide the foregoing information to emergency department providers.     7. I agree that my prescribing PROVIDER has permission to discuss all diagnostic and treatment details with other health care providers, pharmacists, or other professionals who provide my health care regarding my use of Controlled Substances for purposes of maintaining accountability.     8. I will not allow anyone else to have, use sell, or otherwise have access to these medications. The sharing of medications with anyone is absolutely forbidden and is against the law.         9. I understand that Controlled Substances may be hazardous or lethal to a person who is not tolerant to their effects, especially a child, and that I must keep them out of reach of such people for their own safety.     10. I understand that tampering with a written prescription is a felony and I will not change or tamper with the PROVIDER’S written prescription.     11. I am aware that attempting to obtain a Controlled Substance under false pretenses is illegal.     12. I agree not to alter my medication in any way, and I will take my medication whole, and it will not be broken, chewed, crushed, injected, or snorted.     13. I will take my medication as instructed and prescribed, and I will not exceed the maximum prescribed dose. Any change in dosage must be approved by the PROVIDER or a physician within the PRACTICE.     14. I understand that these drugs should not be stopped abruptly, as withdrawal syndromes may develop.     15. I will cooperate with unannounced urine or serum toxicology screenings as may be requested, as well  as any random pill counts of medication by the PROVIDER. Failure to comply may result in termination of the PROVIDER-patient relationship.     16. I understand that the presence of unauthorized and/or illegal substances in the screenings described in the paragraph above may prompt referral for assessment for a substance abuse disorder or termination of the PROVIDER-patient relationship.     17. I understand that medications may not be replaced if they are lost, damaged, or stolen. If any of these situations arise that cause me to request an early refill of my medication, a copy of a filed police report or a statement from me explaining the circumstances may be required before additional prescriptions are considered. If I request an early refill secondary to lost, damaged, or stolen prescriptions twice within a year, I may be discharged from the practice.     18. I understand that a prescription may be given early if the PROVIDER or the patient will be out of town when the refill is due. These prescriptions will contain instructions to the pharmacist that the prescriptions(s) may not be filled prior to the appropriate date.     19. If the responsible legal authorities have questions concerning my treatment, as may occur, for example, if I obtained medication at several pharmacies, all confidentiality is waived, and these authorities may be given full access to my full records of Controlled Substances administration.     20. I will keep my scheduled appointments in order to receive medication renewals. If I need to cancel my appointment, I will do so a minimum of twenty-four (24) hours before it is scheduled.     21. I understand that I may be asked to bring my medications in their original container to the PROVIDER’s office while I am on controlled medication.     22. Refills generally will not be given over the phone, after office hours, during the weekends, and on holidays.     23. I understand that any medical  treatment is initially a trial, with the goal of treatment being to improve the quality of life and ability to function and/or work. These parameters will be assessed periodically to determine the benefits of continued therapy, and continued prescription is contingent on whether my physician believes that the medication usage benefits me. I will comply with all treatments as outlined by the PROVIDER.     24. I have been explained the risks and potential benefits of these therapies, including, but not limited to, psychological addiction, physical dependence, withdrawal and over dosage.     25. I understand that failure to adhere to these policies and/or failure to comply with the PROVIDER’S treatment plan may result in cessation of therapy with Controlled Substance prescribing by the PROVIDER or referral for further specialty assessment, as well as possible discharge from the PRACTICE.     26. I, the undersigned patient, attest that the foregoing was discussed with me, and that I have read, fully understand, and agree to all of the above requirements and instructions. I affirm that I have the full right and power to sign and be bound by this  Agreement.         Date:  __________________________________________    Time:  __________________________________________    Patient Printed Name:  _____________________________    Patient Signature:  ________________________________           Date:  __________________________________________    Time:  __________________________________________    Provider Signature:  _______________________________

## 2024-12-10 NOTE — PROGRESS NOTES
"Chief Complaint  Anxiety and Med Refill (xanax)    Subjective          Anxiety      History of Present Illness      Raphael Garcia male 59 y.o., presents today for follow up of Anxiety. He reports medication is working well, patient desires to continue on Rx, and needs refill. He denies current suicidal and homicidal ideation. Risk factors are family situation/stress. Current treatment includes Alprazolam.  He complains of the following medication side effects: none. The patient has had no previous counseling. He is asymptomatic.          Objective   Vital Signs:   /72 (BP Location: Left arm, Patient Position: Sitting, Cuff Size: Adult)   Pulse 71   Temp 97.8 °F (36.6 °C) (Oral)   Ht 175.3 cm (69\")   Wt 80.2 kg (176 lb 12.8 oz)   SpO2 93%   BMI 26.11 kg/m²      BMI is >= 25 and <30. (Overweight) The following options were offered after discussion;: exercise counseling/recommendations and nutrition counseling/recommendations       Physical Exam  Vitals and nursing note reviewed.   Constitutional:       General: He is not in acute distress.     Appearance: He is well-developed and overweight.   HENT:      Head: Normocephalic and atraumatic.   Eyes:      Conjunctiva/sclera: Conjunctivae normal.      Pupils: Pupils are equal, round, and reactive to light.   Neck:      Vascular: No carotid bruit.   Cardiovascular:      Rate and Rhythm: Normal rate and regular rhythm.      Heart sounds: Normal heart sounds.   Pulmonary:      Effort: Pulmonary effort is normal. No respiratory distress.      Breath sounds: Normal breath sounds.   Musculoskeletal:      Cervical back: Normal range of motion and neck supple.   Skin:     General: Skin is warm and dry.      Coloration: Skin is not jaundiced.   Neurological:      General: No focal deficit present.      Mental Status: He is alert and oriented to person, place, and time.      Motor: No abnormal muscle tone.      Coordination: Coordination normal.   Psychiatric:  "        Mood and Affect: Mood is not anxious or depressed.         Behavior: Behavior normal.         Thought Content: Thought content does not include homicidal or suicidal ideation. Thought content does not include homicidal or suicidal plan.        Physical Exam      The following data was reviewed by: DARRYL Begum on 12/10/2024:  ToxAssure Flex 15, Ur - (03/14/2024 08:09)   Telephone with Doroteo Mckay MD (12/09/2024)     Results                 Assessment and Plan    Assessment & Plan      Assessment & Plan  JAMEL (generalized anxiety disorder)  Well-controlled, asymptomatic  Continue current regimen  Jaden report reviewed  Controlled substance agreement signed today  UDS reviewed  Follow-up with PCP in 3 months    Orders:    ALPRAZolam (Xanax) 1 MG tablet; Take one tablet BID and may take an additional one half tablet at night prn    Controlled substance agreement signed                  Follow Up     Return in about 3 months (around 3/10/2025) for Next scheduled follow up.    Patient was given instructions and counseling regarding his condition or for health maintenance advice. Please see specific information pulled into the AVS if appropriate.     JADEN query complete. Treatment plan to include limited course of prescribed  controlled substance. Risks including addiction, benefits, and alternatives presented to patient.

## 2024-12-10 NOTE — ASSESSMENT & PLAN NOTE
Well-controlled, asymptomatic  Continue current regimen  Vicente report reviewed  Controlled substance agreement signed today  UDS reviewed  Follow-up with PCP in 3 months    Orders:    ALPRAZolam (Xanax) 1 MG tablet; Take one tablet BID and may take an additional one half tablet at night prn

## 2025-01-02 DIAGNOSIS — F41.1 GAD (GENERALIZED ANXIETY DISORDER): ICD-10-CM

## 2025-01-02 RX ORDER — ALPRAZOLAM 1 MG/1
TABLET ORAL
Qty: 75 TABLET | Refills: 0 | OUTPATIENT
Start: 2025-01-02

## 2025-01-02 NOTE — TELEPHONE ENCOUNTER
Caller: Alicja Garcia    Relationship: Emergency Contact    Best call back number: [phone]     Requested Prescriptions:   Requested Prescriptions     Pending Prescriptions Disp Refills    ALPRAZolam (Xanax) 1 MG tablet 75 tablet 0     Sig: Take one tablet BID and may take an additional one half tablet at night prn        Pharmacy where request should be sent: [facility] [id number] [facility] [address] [facility] [phone] [facility] [phone] FX     Last office visit with prescribing clinician: 8/29/2024   Last telemedicine visit with prescribing clinician: Visit date not found   Next office visit with prescribing clinician: 1/9/2025     Additional details provided by patient: PATIENT WIFE ALICJA ANN STATED THAT THIS PRESCRIPTION WAS USUALLY GIVEN WITH 3 REFILLS BUT THE LAST PRESCRIPTION WAS GIVEN FOR ONLY A MONTH AND HAVING TO COME BACK ON 1/09/25, BUT WANTS TO KNOW WHY HE IS HAVING TO COME BACK SO SOON FOR FOLLOW UP.  SHE WANTS TO KNOW IF HE NEEDS TO COME ON THE 1/09/25 APPOINTMENT DUE TO SHE HAS TO TAKE OFF FROM WORK.  PLEASE CALL AND LET HER KNOW.      Does the patient have less than a 3 day supply:  [] Yes  [x] No    Would you like a call back once the refill request has been completed: [] Yes [] No    If the office needs to give you a call back, can they leave a voicemail: [] Yes [] No    Drea Bautista Rep   01/02/25 09:07 EST

## 2025-01-03 ENCOUNTER — PATIENT MESSAGE (OUTPATIENT)
Dept: FAMILY MEDICINE CLINIC | Facility: CLINIC | Age: 60
End: 2025-01-03
Payer: COMMERCIAL

## 2025-01-03 DIAGNOSIS — F41.1 GAD (GENERALIZED ANXIETY DISORDER): ICD-10-CM

## 2025-01-04 RX ORDER — ALPRAZOLAM 1 MG/1
TABLET ORAL
Qty: 75 TABLET | Refills: 1 | Status: SHIPPED | OUTPATIENT
Start: 2025-01-04 | End: 2025-03-05

## 2025-03-06 ENCOUNTER — OFFICE VISIT (OUTPATIENT)
Dept: FAMILY MEDICINE CLINIC | Facility: CLINIC | Age: 60
End: 2025-03-06
Payer: COMMERCIAL

## 2025-03-06 VITALS
HEART RATE: 75 BPM | OXYGEN SATURATION: 97 % | BODY MASS INDEX: 25.77 KG/M2 | DIASTOLIC BLOOD PRESSURE: 80 MMHG | HEIGHT: 69 IN | WEIGHT: 174 LBS | SYSTOLIC BLOOD PRESSURE: 128 MMHG

## 2025-03-06 DIAGNOSIS — I10 ESSENTIAL HYPERTENSION: Primary | ICD-10-CM

## 2025-03-06 DIAGNOSIS — F41.1 GAD (GENERALIZED ANXIETY DISORDER): ICD-10-CM

## 2025-03-06 DIAGNOSIS — R06.02 SHORTNESS OF BREATH: ICD-10-CM

## 2025-03-06 DIAGNOSIS — Z72.0 TOBACCO ABUSE: ICD-10-CM

## 2025-03-06 DIAGNOSIS — E78.49 OTHER HYPERLIPIDEMIA: ICD-10-CM

## 2025-03-06 PROCEDURE — 99214 OFFICE O/P EST MOD 30 MIN: CPT | Performed by: FAMILY MEDICINE

## 2025-03-06 RX ORDER — ALPRAZOLAM 1 MG/1
TABLET ORAL
Qty: 75 TABLET | Refills: 2 | Status: SHIPPED | OUTPATIENT
Start: 2025-03-06 | End: 2025-06-04

## 2025-03-06 RX ORDER — VARENICLINE TARTRATE 1 MG/1
1 TABLET, FILM COATED ORAL 2 TIMES DAILY
Qty: 56 TABLET | Refills: 4 | Status: SHIPPED | OUTPATIENT
Start: 2025-04-03 | End: 2025-08-21

## 2025-03-06 RX ORDER — VARENICLINE TARTRATE 0.5 (11)-1
KIT ORAL
Qty: 1 EACH | Refills: 0 | Status: SHIPPED | OUTPATIENT
Start: 2025-03-06 | End: 2025-04-03

## 2025-03-06 NOTE — ASSESSMENT & PLAN NOTE
Anxiety control.  Vicente report reviewed.  Continue with current dose of alprazolam.  Orders:    ALPRAZolam (Xanax) 1 MG tablet; Take 1 tablet by mouth 2 (Two) Times a Day. May also take 0.5 tablets At Night As Needed for Anxiety. Do all this for 90 days.

## 2025-03-06 NOTE — ASSESSMENT & PLAN NOTE
Blood pressure controlled.  Continue same therapy  Orders:    Comprehensive Metabolic Panel; Future    CBC & Differential; Future    TSH; Future

## 2025-03-06 NOTE — ASSESSMENT & PLAN NOTE
Plan on rechecking lipids at her next visit in 3 months  Orders:    CK; Future    Lipid Panel With / Chol / HDL Ratio; Future

## 2025-03-06 NOTE — ASSESSMENT & PLAN NOTE
Again advised patient to quit smoking.  We did talk about lung cancer screening.  We will go ahead and also order complete PFT to rule out early emphysema due to shortness of breath  Orders:    Varenicline Tartrate, Starter, 0.5 MG X 11 & 1 MG X 42 tablet therapy pack; Take 0.5 mg by mouth Daily for 3 days, THEN 0.5 mg 2 (Two) Times a Day for 4 days, THEN 1 mg 2 (Two) Times a Day for 21 days. Take 0.5 mg po daily x 3 days, then 0.5 mg po bid x 4 days, then 1 mg po bid    varenicline (CHANTIX) 1 MG tablet; Take 1 tablet by mouth 2 (Two) Times a Day for 140 days.    Complete PFT - Pre & Post Bronchodilator; Future

## 2025-03-06 NOTE — PROGRESS NOTES
"Chief Complaint  Follow-up (3 month) and JAMEL    Subjective        HPI      The patient presents for a routine visit.    He reports overall good health with stable blood pressure readings. He is currently on a regimen of alprazolam, taking one tablet twice daily and an additional half tablet as needed. This medication appears to be effective in managing his symptoms. He has been informed by his pharmacy that his prescription for amlodipine is ready for pickup. He also takes irbesartan, with a 3-month supply available. His last laboratory tests were conducted in November 2024.    He admits to minimal smoking, consuming approximately one pack of cigarettes per week. He has observed an increase in shortness of breath over the past month. He has previously used Chantix, which he found beneficial. He has not undergone any lung function tests. Prior to his stroke, he was smoking 1.5 packs per day. He was exposed to secondhand smoke during his childhood as both his parents were smokers. To manage his shortness of breath, he has resorted to using an over-the-counter inhaler from [employer], which unfortunately induces dizziness and a sensation of grogginess.    SOCIAL HISTORY  - Smokes very little, approximately one pack of cigarettes lasting a week    FAMILY HISTORY  - Parents smoked    MEDICATIONS  - Alprazolam  - Amlodipine  - Irbesartan           Vital Signs:   Vitals:    03/06/25 1407   BP: 128/80   Pulse: 75   SpO2: 97%   Weight: 78.9 kg (174 lb)   Height: 175.3 cm (69\")            3/6/2025     2:14 PM   PHQ-2/PHQ-9 Depression Screening   Little interest or pleasure in doing things Not at all   Feeling down, depressed, or hopeless Not at all                 Physical Exam     General Appearance: No Acute Distress, normal appearance, well developed, well nourished.  Vital signs: Blood pressure is 128/80.  HEENT:   Respiratory: lungs clear to auscultation, no wheezes, normal respiratory effort.  Cardiovascular: Regular " rate and rhythm with no murmurs, rubs, or gallop. No carotid bruits auscultated bilaterally.  Gastrointestinal:   Genitourinary:   Lymphatic:   Back, Musculoskeletal:   Extremities: No pretibial edema, bilaterally.  Skin: Warm and dry, no rash.  Neurological:   Psychiatric: patient cooperative, normal affect.  Other observations:              Results                  Assessment and Plan      1. Anxiety: Stable.  - Continue alprazolam 1 tablet twice a day and half as needed.  - Vicente report reviewed.    2. Hypertension: Stable.  - Continue current antihypertensive therapy, including amlodipine and irbesartan.    3. Shortness of breath.  - Order complete pulmonary function test (PFT) to rule out early emphysema.  - Prescription for Chantix, including a starter pack and additional continuing packs for up to 6 months, will be sent to the pharmacy.  - Advised to quit smoking.  - Discussed benefits and risks of lung cancer screening; review information and decide whether to proceed.    4. Health maintenance.  - Reassess lipid profile during the next visit in 3 months.  - Physical examination scheduled for the next visit to stay compliant with annual physical requirements.    Follow-up  - Follow up in 3 months.         JAMEL (generalized anxiety disorder)    Anxiety control.  Vicente report reviewed.  Continue with current dose of alprazolam.  Orders:    ALPRAZolam (Xanax) 1 MG tablet; Take 1 tablet by mouth 2 (Two) Times a Day. May also take 0.5 tablets At Night As Needed for Anxiety. Do all this for 90 days.    Essential hypertension    Blood pressure controlled.  Continue same therapy  Orders:    Comprehensive Metabolic Panel; Future    CBC & Differential; Future    TSH; Future    Other hyperlipidemia     Plan on rechecking lipids at her next visit in 3 months  Orders:    CK; Future    Lipid Panel With / Chol / HDL Ratio; Future    Tobacco abuse  Again advised patient to quit smoking.  We did talk about lung cancer  screening.  We will go ahead and also order complete PFT to rule out early emphysema due to shortness of breath  Orders:    Varenicline Tartrate, Starter, 0.5 MG X 11 & 1 MG X 42 tablet therapy pack; Take 0.5 mg by mouth Daily for 3 days, THEN 0.5 mg 2 (Two) Times a Day for 4 days, THEN 1 mg 2 (Two) Times a Day for 21 days. Take 0.5 mg po daily x 3 days, then 0.5 mg po bid x 4 days, then 1 mg po bid    varenicline (CHANTIX) 1 MG tablet; Take 1 tablet by mouth 2 (Two) Times a Day for 140 days.    Complete PFT - Pre & Post Bronchodilator; Future    Shortness of breath  A complete pulmonary function testing and further treatment regimen based on this.  Lung cancer screening discussed during today's visit he will think about and reviewed the pros and cons with the risks and the after visit summary.  Orders:    Complete PFT - Pre & Post Bronchodilator; Future       Return in about 3 months (around 6/6/2025) for Adult wellness & medication appt, schedule 30 min.     Patient was given instructions and counseling regarding his condition or for health maintenance advice. Please see specific information pulled into the AVS if appropriate.     Patient or patient representative verbalized consent for the use of Ambient Listening during the visit with  Doroteo Mckay MD for chart documentation. 3/6/2025  14:37 EST

## 2025-03-18 ENCOUNTER — PATIENT MESSAGE (OUTPATIENT)
Dept: FAMILY MEDICINE CLINIC | Facility: CLINIC | Age: 60
End: 2025-03-18
Payer: COMMERCIAL

## 2025-03-18 DIAGNOSIS — Z87.891 PERSONAL HISTORY OF TOBACCO USE, PRESENTING HAZARDS TO HEALTH: Primary | ICD-10-CM

## 2025-03-19 ENCOUNTER — TELEPHONE (OUTPATIENT)
Dept: FAMILY MEDICINE CLINIC | Facility: CLINIC | Age: 60
End: 2025-03-19
Payer: COMMERCIAL

## 2025-03-19 DIAGNOSIS — Z12.5 SCREENING FOR PROSTATE CANCER: Primary | ICD-10-CM

## 2025-03-28 ENCOUNTER — PATIENT MESSAGE (OUTPATIENT)
Dept: FAMILY MEDICINE CLINIC | Facility: CLINIC | Age: 60
End: 2025-03-28
Payer: COMMERCIAL

## 2025-04-04 ENCOUNTER — TELEPHONE (OUTPATIENT)
Dept: FAMILY MEDICINE CLINIC | Facility: CLINIC | Age: 60
End: 2025-04-04
Payer: COMMERCIAL

## 2025-04-04 NOTE — TELEPHONE ENCOUNTER
Klaudia with [facility] called concerning lung cancer screening. We have a CT chest low dose order in for lung cancer screening. There should be some questions that are answered at the bottom of the order. I did not see any. She is going to fax over the questions to be answered. She will put to Dr. Mckay/Isra's attn.

## 2025-04-07 NOTE — TELEPHONE ENCOUNTER
I can redo the low-dose CT of the chest order but I need you to find out what his pack-year smoking history is if he still smokes or when he stopped smoking and make sure he does not have any signs or symptoms of lung cancer

## 2025-04-30 ENCOUNTER — RESULTS FOLLOW-UP (OUTPATIENT)
Dept: FAMILY MEDICINE CLINIC | Facility: CLINIC | Age: 60
End: 2025-04-30
Payer: COMMERCIAL

## 2025-04-30 NOTE — LETTER
Raphael Garcia  [id number] [facility] [id number]    May 1, 2025     Dear Mr. Garcia:    Below are the results from your recent CT:    Your test results have some abnormalities that we need to discuss.  Please schedule an appointment to discuss further.  This can be a video visit.           Sincerely,      Doroteo Mckay MD       20 Week Level II Sonogram/Non Invasive Prenatal Screen (NIPS)/Ultra Screen at 12 Weeks

## 2025-05-01 NOTE — PROGRESS NOTES
Please give the patient the following message:  Your test results have some abnormalities that we need to discuss.  Please schedule an appointment to discuss further.  This can be a video visit.

## 2025-05-05 ENCOUNTER — TELEPHONE (OUTPATIENT)
Dept: FAMILY MEDICINE CLINIC | Facility: CLINIC | Age: 60
End: 2025-05-05
Payer: COMMERCIAL

## 2025-05-05 NOTE — TELEPHONE ENCOUNTER
Caller: Raphael Garcia    Relationship: Self    Best call back number: :[phone]        What was the call regarding: PATIENT CALLING WANTED TO GET RESULTS ON BREATHING TEST AND XRAY  HE HAD DONE     PLEASE GIVE CALLBACK

## 2025-05-06 NOTE — TELEPHONE ENCOUNTER
Spoke with to inform him that his provider is requesting for him to have his PFT completed before his appt. Patient stated that he has had the test performed. Patient has been schedule to see his provider May 12

## 2025-05-12 ENCOUNTER — OFFICE VISIT (OUTPATIENT)
Dept: FAMILY MEDICINE CLINIC | Facility: CLINIC | Age: 60
End: 2025-05-12
Payer: COMMERCIAL

## 2025-05-12 VITALS
WEIGHT: 179.2 LBS | OXYGEN SATURATION: 95 % | HEIGHT: 69 IN | HEART RATE: 75 BPM | BODY MASS INDEX: 26.54 KG/M2 | TEMPERATURE: 96.7 F | DIASTOLIC BLOOD PRESSURE: 80 MMHG | SYSTOLIC BLOOD PRESSURE: 138 MMHG

## 2025-05-12 DIAGNOSIS — Z72.0 TOBACCO ABUSE: ICD-10-CM

## 2025-05-12 DIAGNOSIS — J43.8 OTHER EMPHYSEMA: ICD-10-CM

## 2025-05-12 DIAGNOSIS — R91.8 LUNG NODULES: Primary | ICD-10-CM

## 2025-05-12 PROCEDURE — 99214 OFFICE O/P EST MOD 30 MIN: CPT | Performed by: FAMILY MEDICINE

## 2025-05-12 PROCEDURE — 99407 BEHAV CHNG SMOKING > 10 MIN: CPT | Performed by: FAMILY MEDICINE

## 2025-05-12 RX ORDER — BUDESONIDE, GLYCOPYRROLATE, AND FORMOTEROL FUMARATE 160; 9; 4.8 UG/1; UG/1; UG/1
2 AEROSOL, METERED RESPIRATORY (INHALATION) 2 TIMES DAILY
Qty: 1 EACH | Refills: 2 | Status: SHIPPED | OUTPATIENT
Start: 2025-05-12 | End: 2025-08-10

## 2025-05-12 NOTE — ASSESSMENT & PLAN NOTE
Patient has prescription of Chantix and will start with a starter pack with the goal of quitting smoking within the next 90 days.  Information shared in the after visit summary about quitting smoking.

## 2025-05-12 NOTE — ASSESSMENT & PLAN NOTE
COPD is newly identified.  Patient is having breathlessness with minimal activity.  In addition to quitting smoking we will have him start with Breztri inhaler 2 puffs daily.  Follow-up in the office at her next visit on June 9  Orders:    Breztri Aerosphere 160-9-4.8 MCG/ACT aerosol inhaler; Inhale 2 puffs 2 (Two) Times a Day for 90 days.

## 2025-05-12 NOTE — PROGRESS NOTES
"Chief Complaint  Results (CT results)    Subjective        HPI      The patient presents for evaluation of COPD and smoking cessation.  Patient is having more shortness of breath with minimal activity.    He has dyspnea, prompting a CT chest scan, x-ray, and pulmonary function test. He has a 30-year smoking history and has not quit.    SOCIAL HISTORY  30-year smoking history, has not quit.           Vital Signs:   Vitals:    25 1619   BP: 138/80   Pulse: 75   Temp: 96.7 °F (35.9 °C)   TempSrc: Temporal   SpO2: 95%   Weight: 81.3 kg (179 lb 3.2 oz)   Height: 175.3 cm (69\")   PainSc: 0-No pain            2025     4:19 PM   PHQ-2/PHQ-9 Depression Screening   Little interest or pleasure in doing things Not at all   Feeling down, depressed, or hopeless Not at all   How difficult have these problems made it for you to do your work, take care of things at home, or get along with other people? Not difficult at all                 Physical Exam     General Appearance: Normal appearance, No acute distress.  Respiratory: No respiratory distress, lungs with scattered wheezes cardiovascular: regular rate and rhythm with no murmurs, rubs, or gallop.  Psychiatric: normal affect, pleasant, cooperative with exam.  Other observations: no carotid bruits auscultated.       The following data was reviewed by: Zelda Small MD on 2025:  Addendum by PARISH VIZCAINO MD-PUL on May 05, 2025 11:56:00 EDT  I have the reviewed the PFT and agree with the fellow's interpretation.  Electronically Signed on2025 11:56 EDT  PARISH VIZCAINO MD-PUL  Modified by: PARISH VIZCAINO MD-PULon 25 11:56 EDT  Patient: ADARSH RENTERIA MRN: [id number] FIN: [id number]  Age: 59 Years Sex: Male : 1965  Attending: Dr. Vizcaino  Date of Service: 2025  Referring Provider: zelda small  Reason for PFT/Diagnosis: SOB  FEV1/FVC:  FVC: %  FEV1: %  TLC: %  DLCO: %  Spirometry demonstrates moderate airway " obstruction  Formal lung volume are consistent with air trapping  Lung volumes were determined by plethysmography in a satisfactory fashion  The diffusion capacity for carbon monoxide, a reflection of alveolar-capillary gas transport is substantially reduced  NO significant response to bronchodilation  Electronically Signed on05/02/2025 17:26 EDT    Results  - Imaging (CT lung):    - Scattered sub-4 mm noncalcified pulmonary nodules likely related to prior infection or inflammation    - Calcified nodules consistent with old healed granulomatous disease    - Central airways patent without endobronchial lesion    - Evidence of subsegmental atelectasis    - No pleural effusion or lymphadenopathy    - Heart and aorta normal size, with aortic cholesterol buildup    - Upper abdomen x-rays show hyperattenuating superior left kidney lesion consistent with hemorrhagic or proteinaceous cyst    - No acute or suspicious abnormality    - Testing:    - Spirometry demonstrates moderate airway obstruction                Assessment and Plan      COPD.  Newly identified COPD with breathlessness on minimal activity. Spirometry shows moderate airway obstruction, consistent with COPD/emphysema due to smoking. Bronchodilator did not improve condition, ruling out asthma. Advised regular exercise and smoking cessation. Start Breztri inhaler, 2 puffs daily, followed by water to prevent thrush. Discussed potential side effects: coughing, sore throat, nosebleeds. Prescription sent to [facility].  2 sample canisters of Breztri along with coupon given to patient during today's visit    Smoking Cessation.  30-year smoking history, has not quit. Prescribed Chantix starter pack, aim to quit within 90 days. Zac quit date on calendar, take medication for 6 months for better outcomes. Quitting information included in after-visit summary.  Raphael Garcia  reports that he has been smoking cigarettes. He started smoking about 41 years ago. He has a  41.4 pack-year smoking history. He has been exposed to tobacco smoke. He has never used smokeless tobacco. I have educated him on the risk of diseases from using tobacco products such as cancer and COPD.     I advised him to quit and he is willing to quit. We have discussed the following method/s for tobacco cessation:  Education Material, Counseling, and Prescription Medication.  Together we have set a quit date for  3 months from today .  He will follow up with me in 1 month or sooner to check on his progress.    I spent >10 minutes counseling the patient.         Follow-up  Follow up on 06/09/2025.         Lung nodules  Follow-up with low-dose x-ray of the lungs in 1 year to screen       Tobacco abuse  Patient has prescription of Chantix and will start with a starter pack with the goal of quitting smoking within the next 90 days.  Information shared in the after visit summary about quitting smoking.       Other emphysema    COPD is newly identified.  Patient is having breathlessness with minimal activity.  In addition to quitting smoking we will have him start with Breztri inhaler 2 puffs daily.  Follow-up in the office at her next visit on June 9  Orders:    Breztri Aerosphere 160-9-4.8 MCG/ACT aerosol inhaler; Inhale 2 puffs 2 (Two) Times a Day for 90 days.       Return in 4 weeks (on 6/9/2025) for next scheduled follow up.     Patient was given instructions and counseling regarding his condition or for health maintenance advice. Please see specific information pulled into the AVS if appropriate.     Patient or patient representative verbalized consent for the use of Ambient Listening during the visit with  Doroteo Mckay MD for chart documentation. 5/12/2025  16:43 EDT

## 2025-06-06 RX ORDER — VARENICLINE TARTRATE 1 MG/1
1 TABLET, FILM COATED ORAL 2 TIMES DAILY
Qty: 56 TABLET | Refills: 4 | Status: CANCELLED | OUTPATIENT
Start: 2025-06-06 | End: 2025-10-24

## 2025-06-09 ENCOUNTER — OFFICE VISIT (OUTPATIENT)
Dept: FAMILY MEDICINE CLINIC | Facility: CLINIC | Age: 60
End: 2025-06-09
Payer: COMMERCIAL

## 2025-06-09 VITALS
SYSTOLIC BLOOD PRESSURE: 134 MMHG | DIASTOLIC BLOOD PRESSURE: 76 MMHG | OXYGEN SATURATION: 96 % | BODY MASS INDEX: 26.07 KG/M2 | HEART RATE: 74 BPM | WEIGHT: 176 LBS | HEIGHT: 69 IN

## 2025-06-09 DIAGNOSIS — F41.1 GAD (GENERALIZED ANXIETY DISORDER): ICD-10-CM

## 2025-06-09 DIAGNOSIS — J30.1 SEASONAL ALLERGIC RHINITIS DUE TO POLLEN: ICD-10-CM

## 2025-06-09 DIAGNOSIS — E78.49 OTHER HYPERLIPIDEMIA: ICD-10-CM

## 2025-06-09 DIAGNOSIS — I10 ESSENTIAL HYPERTENSION: Primary | ICD-10-CM

## 2025-06-09 DIAGNOSIS — Z86.73 HISTORY OF CVA (CEREBROVASCULAR ACCIDENT): ICD-10-CM

## 2025-06-09 DIAGNOSIS — Z12.11 ENCOUNTER FOR SCREENING FOR MALIGNANT NEOPLASM OF COLON: ICD-10-CM

## 2025-06-09 DIAGNOSIS — Z72.0 TOBACCO ABUSE: ICD-10-CM

## 2025-06-09 DIAGNOSIS — J43.8 OTHER EMPHYSEMA: ICD-10-CM

## 2025-06-09 DIAGNOSIS — R35.1 NOCTURIA: ICD-10-CM

## 2025-06-09 PROCEDURE — 99214 OFFICE O/P EST MOD 30 MIN: CPT | Performed by: FAMILY MEDICINE

## 2025-06-09 RX ORDER — CETIRIZINE HYDROCHLORIDE 10 MG/1
10 TABLET ORAL DAILY
Qty: 90 TABLET | Refills: 1 | Status: SHIPPED | OUTPATIENT
Start: 2025-06-09

## 2025-06-09 RX ORDER — ALPRAZOLAM 1 MG/1
TABLET ORAL
Qty: 75 TABLET | Refills: 2 | Status: SHIPPED | OUTPATIENT
Start: 2025-06-09 | End: 2025-09-07

## 2025-06-09 RX ORDER — METOPROLOL SUCCINATE 50 MG/1
50 TABLET, EXTENDED RELEASE ORAL NIGHTLY
Qty: 90 TABLET | Refills: 2 | Status: SHIPPED | OUTPATIENT
Start: 2025-06-09 | End: 2026-03-06

## 2025-06-09 RX ORDER — AMLODIPINE BESYLATE 5 MG/1
5 TABLET ORAL NIGHTLY
Qty: 90 TABLET | Refills: 2 | Status: SHIPPED | OUTPATIENT
Start: 2025-06-09 | End: 2026-03-06

## 2025-06-09 RX ORDER — ROSUVASTATIN CALCIUM 40 MG/1
40 TABLET, COATED ORAL NIGHTLY
Qty: 90 TABLET | Refills: 2 | Status: SHIPPED | OUTPATIENT
Start: 2025-06-09 | End: 2026-03-06

## 2025-06-09 RX ORDER — BUDESONIDE, GLYCOPYRROLATE, AND FORMOTEROL FUMARATE 160; 9; 4.8 UG/1; UG/1; UG/1
2 AEROSOL, METERED RESPIRATORY (INHALATION) 2 TIMES DAILY
Qty: 1 EACH | Refills: 2 | Status: SHIPPED | OUTPATIENT
Start: 2025-06-09 | End: 2025-09-07

## 2025-06-09 RX ORDER — IRBESARTAN 300 MG/1
300 TABLET ORAL NIGHTLY
Qty: 90 TABLET | Refills: 2 | Status: SHIPPED | OUTPATIENT
Start: 2025-06-09 | End: 2026-03-06

## 2025-06-09 NOTE — PATIENT INSTRUCTIONS
You are due for Shingrix vaccination series to prevent Shingles. Please get the immunization at your local pharmacy. It is more effective than the old Zostavax vaccine and is recommended even if you have had the Zostavax vaccine in the past. For more information, please look at the website below:  https://www.cdc.gov/vaccines/vpd/shingles/public/shingrix/index.html    Shingrix Vaccine Injection  What is this medication?  ZOSTER VACCINE (ZOS ter vak SEEN) reduces the risk of herpes zoster (shingles). It does not treat shingles. It is still possible to get shingles after receiving the vaccine, but the symptoms may be less severe or not last as long. It works by helping your immune system learn how to fight off a future infection.  This medicine may be used for other purposes; ask your health care provider or pharmacist if you have questions.  COMMON BRAND NAME(S): SHINGRIX  What should I tell my care team before I take this medication?  They need to know if you have any of these conditions:  Cancer  Immune system problems  An unusual or allergic reaction to Zoster vaccine, other medications, foods, dyes, or preservatives  Pregnant or trying to get pregnant  Breastfeeding  How should I use this medication?  This vaccine is injected into a muscle. It is given by your care team.  This vaccine requires 2 doses to get the full benefit. Set a reminder for when your next dose is due.  A copy of Vaccine Information Statements will be given before each vaccination. Be sure to read this information carefully each time. This sheet may change often.  Talk to your care team about the use of this vaccine in children. This vaccine is not approved for use in children.  Overdosage: If you think you have taken too much of this medicine contact a poison control center or emergency room at once.  NOTE: This medicine is only for you. Do not share this medicine with others.  What if I miss a dose?  Keep appointments for follow-up (booster)  doses. It is important not to miss your dose. Call your care team if you are unable to keep an appointment.  What may interact with this medication?  Medications that suppress your immune system  Medications to treat cancer  Steroid medications, such as prednisone or cortisone  This list may not describe all possible interactions. Give your health care provider a list of all the medicines, herbs, non-prescription drugs, or dietary supplements you use. Also tell them if you smoke, drink alcohol, or use illegal drugs. Some items may interact with your medicine.  What should I watch for while using this medication?  Visit your care team regularly.  This vaccine, like all vaccines, may not fully protect everyone.  What side effects may I notice from receiving this medication?  Side effects that you should report to your care team as soon as possible:  Allergic reactions--skin rash, itching, hives, swelling of the face, lips, tongue, or throat  Side effects that usually do not require medical attention (report these to your care team if they continue or are bothersome):  Chills  Fatigue  Feeling faint or lightheaded  Fever  Headache  Muscle pain  Pain, redness, or irritation at injection site  This list may not describe all possible side effects. Call your doctor for medical advice about side effects. You may report side effects to FDA at 4-555-GNG-0482.  Where should I keep my medication?  This vaccine is only given by your care team. It will not be stored at home.  NOTE: This sheet is a summary. It may not cover all possible information. If you have questions about this medicine, talk to your doctor, pharmacist, or health care provider.  © 2023 [employer]vier/Gold Standard (2023-06-08 00:00:00)     You are due for Covid booster. Please get this vaccine at your local pharmacy. COVID-19 vaccine may be administered without regards to timing of other vaccines.  If administering on the same day as another vaccine, administer in a  different site (arm).  COVID-19 VACCINE reduces the risk of COVID-19. It does not treat COVID-19. It is still possible to get COVID-19 after receiving this vaccine, but the symptoms may be less severe or not last as long. It works by helping your immune system learn how to fight off a future infection.     What side effects may I notice from receiving this medication?  Side effects that you should report to your care team as soon as possible:  Allergic reactions--skin rash, itching, hives, swelling of the face, lips, tongue, or throat  Heart muscle inflammation--unusual weakness or fatigue, shortness of breath, chest pain, fast or irregular heartbeat, dizziness, swelling of the ankles, feet, or hands  Side effects that usually do not require medical attention (report these to your care team if they continue or are bothersome):  Chills  Fatigue  Fever  Headache  Joint pain  Muscle pain  Nausea  Pain, redness, or irritation at injection site  Swollen lymph nodes  Vomiting  This list may not describe all possible side effects. Call your doctor for medical advice about side effects. You may report side effects to FDA at 2-965-FDA-3989.

## 2025-06-09 NOTE — ASSESSMENT & PLAN NOTE
Condition is stable. The current medical regimen is effective;  continue present plan and medications.  Orders:    rosuvastatin (CRESTOR) 40 MG tablet; Take 1 tablet by mouth Every Night for 270 days.    CK; Future    Lipid Panel With / Chol / HDL Ratio; Future

## 2025-06-09 NOTE — ASSESSMENT & PLAN NOTE
The current medical regimen is effective;  continue present plan and medications.  Vicente report reviewed  Orders:    ALPRAZolam (Xanax) 1 MG tablet; Take 1 tablet by mouth 2 (Two) Times a Day. May also take 0.5 tablets At Night As Needed for Anxiety. Do all this for 90 days.

## 2025-06-09 NOTE — ASSESSMENT & PLAN NOTE
Essential hypertension controlled.  Continue same medication  Orders:    amLODIPine (NORVASC) 5 MG tablet; Take 1 tablet by mouth Every Night for 270 days.    irbesartan (AVAPRO) 300 MG tablet; Take 1 tablet by mouth Every Night for 270 days.    metoprolol succinate XL (Toprol XL) 50 MG 24 hr tablet; Take 1 tablet by mouth Every Night for 270 days.    Comprehensive Metabolic Panel; Future    CBC & Differential; Future    TSH; Future

## 2025-06-09 NOTE — ASSESSMENT & PLAN NOTE
COPD symptoms much improved with Breztri.  Continue same medication.  Orders:    Breztri Aerosphere 160-9-4.8 MCG/ACT aerosol inhaler; Inhale 2 puffs 2 (Two) Times a Day for 90 days.

## 2025-06-09 NOTE — ASSESSMENT & PLAN NOTE
Condition is stable. The current medical regimen is effective;  continue present plan and medications.  Orders:    rosuvastatin (CRESTOR) 40 MG tablet; Take 1 tablet by mouth Every Night for 270 days.    Lipid Panel With / Chol / HDL Ratio; Future     2 = A lot of assistance

## 2025-06-09 NOTE — ASSESSMENT & PLAN NOTE
Condition is stable. The current medical regimen is effective;  continue present plan and medications.  Orders:    cetirizine (zyrTEC) 10 MG tablet; Take 1 tablet by mouth Daily.

## 2025-06-09 NOTE — PROGRESS NOTES
"Chief Complaint  Follow-up (3 month), Anxiety, Hypertension, and Shortness of Breath    Subjective        HPI      The patient presents for a routine visit.    His anxiety is well-managed with alprazolam, taken every 3 months. He has not started Chantix for smoking cessation.    Respiratory function has improved significantly with Breztri, with no gasping for air or shortness of breath. Mild side effects include dry throat and increased perspiration. No recent illnesses, hospitalizations, coughs, or colds. Uses prescription Zyrtec.    No colon cancer screening but completed Cologuard test. Wife wants him to undergo colonoscopy. Occasional nocturia but no urologist consultation. Received pneumonia vaccine in 2022. Takes blood pressure medications at night with water around 9 PM.    SOCIAL HISTORY  He is a smoker.    MEDICATIONS  Alprazolam, Breztri, Zyrtec, Chantix (not started yet).    IMMUNIZATIONS  He received a pneumonia vaccine in 2022.           Vital Signs:   Vitals:    06/09/25 1120   BP: 134/76   Pulse: 74   SpO2: 96%   Weight: 79.8 kg (176 lb)   Height: 175.3 cm (69\")            5/12/2025     4:19 PM   PHQ-2/PHQ-9 Depression Screening   Little interest or pleasure in doing things Not at all   Feeling down, depressed, or hopeless Not at all   How difficult have these problems made it for you to do your work, take care of things at home, or get along with other people? Not difficult at all                 Physical Exam     General Appearance: Normal appearance, No acute distress.  Vital signs: Blood pressure: 134/76.  Respiratory: No respiratory distress, lungs clear to auscultation with no wheezes or rubs.  Cardiovascular: regular rate and rhythm with no murmurs, rubs, or gallop.  Extremities: no pretibial edema bilaterally.  Psychiatric: normal affect, pleasant, cooperative with exam.  Other observations: no carotid bruits auscultated bilaterally.             Results                  Assessment and Plan    "   Anxiety.  Alprazolam effective. Refill provided.    Hypertension.  Blood pressure well-controlled at 134/76. Takes medications at night with water.    Respiratory issues.  Significant improvement with Breztri, no gasping or shortness of breath. Minor side effects: dry throat, increased sweating. Advised to luz maria medication with water. Refill provided.    Smoking cessation.  Encouraged to start Chantix.    Health maintenance.  Received pneumonia vaccine in 2022, due for another at age 64. Discussed new shingles vaccine (2-shot series). Colonoscopy ordered for colon cancer screening. PSA blood test in 3 months during annual physical.         JAMEL (generalized anxiety disorder)    The current medical regimen is effective;  continue present plan and medications.  Vicente report reviewed  Orders:    ALPRAZolam (Xanax) 1 MG tablet; Take 1 tablet by mouth 2 (Two) Times a Day. May also take 0.5 tablets At Night As Needed for Anxiety. Do all this for 90 days.    Essential hypertension    Essential hypertension controlled.  Continue same medication  Orders:    amLODIPine (NORVASC) 5 MG tablet; Take 1 tablet by mouth Every Night for 270 days.    irbesartan (AVAPRO) 300 MG tablet; Take 1 tablet by mouth Every Night for 270 days.    metoprolol succinate XL (Toprol XL) 50 MG 24 hr tablet; Take 1 tablet by mouth Every Night for 270 days.    Comprehensive Metabolic Panel; Future    CBC & Differential; Future    TSH; Future    Other emphysema    COPD symptoms much improved with Breztri.  Continue same medication.  Orders:    Breztri Aerosphere 160-9-4.8 MCG/ACT aerosol inhaler; Inhale 2 puffs 2 (Two) Times a Day for 90 days.    Seasonal allergic rhinitis due to pollen  Condition is stable. The current medical regimen is effective;  continue present plan and medications.  Orders:    cetirizine (zyrTEC) 10 MG tablet; Take 1 tablet by mouth Daily.    History of CVA (cerebrovascular accident)  Condition is stable. The current medical  regimen is effective;  continue present plan and medications.  Orders:    rosuvastatin (CRESTOR) 40 MG tablet; Take 1 tablet by mouth Every Night for 270 days.    Lipid Panel With / Chol / HDL Ratio; Future    Other hyperlipidemia     Condition is stable. The current medical regimen is effective;  continue present plan and medications.  Orders:    rosuvastatin (CRESTOR) 40 MG tablet; Take 1 tablet by mouth Every Night for 270 days.    CK; Future    Lipid Panel With / Chol / HDL Ratio; Future    Tobacco abuse  Advised patient to start taking Chantix       Encounter for screening for malignant neoplasm of colon  Colonoscopy referral ordered  Orders:    Ambulatory Referral For Screening Colonoscopy    Nocturia  Nocturia symptoms are stable. PSA will be monitored with annual labs.  Orders:    PSA DIAGNOSTIC; Future       Return in about 3 months (around 9/9/2025) for Adult wellness & medication appt, schedule 30 min.     Patient was given instructions and counseling regarding his condition or for health maintenance advice. Please see specific information pulled into the AVS if appropriate.     Patient or patient representative verbalized consent for the use of Ambient Listening during the visit with  Doroteo Mckay MD for chart documentation. 6/9/2025  12:05 EDT